# Patient Record
Sex: FEMALE | Race: WHITE
[De-identification: names, ages, dates, MRNs, and addresses within clinical notes are randomized per-mention and may not be internally consistent; named-entity substitution may affect disease eponyms.]

---

## 2017-07-19 ENCOUNTER — APPOINTMENT (OUTPATIENT)
Dept: MAMMOGRAPHY | Facility: CLINIC | Age: 57
End: 2017-07-19

## 2017-07-19 ENCOUNTER — APPOINTMENT (OUTPATIENT)
Dept: ULTRASOUND IMAGING | Facility: CLINIC | Age: 57
End: 2017-07-19

## 2017-07-19 ENCOUNTER — OUTPATIENT (OUTPATIENT)
Dept: OUTPATIENT SERVICES | Facility: HOSPITAL | Age: 57
LOS: 1 days | End: 2017-07-19
Payer: MEDICAID

## 2017-07-19 DIAGNOSIS — Z00.8 ENCOUNTER FOR OTHER GENERAL EXAMINATION: ICD-10-CM

## 2017-07-19 PROCEDURE — 77067 SCR MAMMO BI INCL CAD: CPT

## 2017-07-19 PROCEDURE — 77063 BREAST TOMOSYNTHESIS BI: CPT

## 2017-07-19 PROCEDURE — 76536 US EXAM OF HEAD AND NECK: CPT

## 2017-09-08 ENCOUNTER — APPOINTMENT (OUTPATIENT)
Dept: ULTRASOUND IMAGING | Facility: CLINIC | Age: 57
End: 2017-09-08
Payer: MEDICAID

## 2017-09-08 ENCOUNTER — OUTPATIENT (OUTPATIENT)
Dept: OUTPATIENT SERVICES | Facility: HOSPITAL | Age: 57
LOS: 1 days | End: 2017-09-08
Payer: MEDICAID

## 2017-09-08 DIAGNOSIS — Z00.8 ENCOUNTER FOR OTHER GENERAL EXAMINATION: ICD-10-CM

## 2017-09-08 PROCEDURE — 70490 CT SOFT TISSUE NECK W/O DYE: CPT

## 2017-09-08 PROCEDURE — 70490 CT SOFT TISSUE NECK W/O DYE: CPT | Mod: 26

## 2018-07-20 ENCOUNTER — APPOINTMENT (OUTPATIENT)
Dept: MAMMOGRAPHY | Facility: CLINIC | Age: 58
End: 2018-07-20
Payer: MEDICAID

## 2018-07-20 ENCOUNTER — OUTPATIENT (OUTPATIENT)
Dept: OUTPATIENT SERVICES | Facility: HOSPITAL | Age: 58
LOS: 1 days | End: 2018-07-20
Payer: MEDICAID

## 2018-07-20 DIAGNOSIS — Z00.8 ENCOUNTER FOR OTHER GENERAL EXAMINATION: ICD-10-CM

## 2018-07-20 PROCEDURE — 77063 BREAST TOMOSYNTHESIS BI: CPT | Mod: 26

## 2018-07-20 PROCEDURE — 77063 BREAST TOMOSYNTHESIS BI: CPT

## 2018-07-20 PROCEDURE — 77067 SCR MAMMO BI INCL CAD: CPT

## 2018-07-20 PROCEDURE — 77067 SCR MAMMO BI INCL CAD: CPT | Mod: 26

## 2018-11-07 ENCOUNTER — APPOINTMENT (OUTPATIENT)
Dept: RADIOLOGY | Facility: CLINIC | Age: 58
End: 2018-11-07
Payer: MEDICAID

## 2018-11-07 ENCOUNTER — OUTPATIENT (OUTPATIENT)
Dept: OUTPATIENT SERVICES | Facility: HOSPITAL | Age: 58
LOS: 1 days | End: 2018-11-07
Payer: MEDICAID

## 2018-11-07 DIAGNOSIS — Z00.8 ENCOUNTER FOR OTHER GENERAL EXAMINATION: ICD-10-CM

## 2018-11-07 PROCEDURE — 71046 X-RAY EXAM CHEST 2 VIEWS: CPT | Mod: 26

## 2018-11-07 PROCEDURE — 71046 X-RAY EXAM CHEST 2 VIEWS: CPT

## 2018-11-20 ENCOUNTER — RESULT REVIEW (OUTPATIENT)
Age: 58
End: 2018-11-20

## 2019-07-13 ENCOUNTER — EMERGENCY (EMERGENCY)
Facility: HOSPITAL | Age: 59
LOS: 0 days | Discharge: HOME | End: 2019-07-13
Attending: EMERGENCY MEDICINE | Admitting: EMERGENCY MEDICINE
Payer: MEDICAID

## 2019-07-13 VITALS
DIASTOLIC BLOOD PRESSURE: 85 MMHG | TEMPERATURE: 98 F | RESPIRATION RATE: 18 BRPM | HEART RATE: 77 BPM | SYSTOLIC BLOOD PRESSURE: 128 MMHG | OXYGEN SATURATION: 95 %

## 2019-07-13 VITALS — WEIGHT: 166.89 LBS | HEIGHT: 56 IN

## 2019-07-13 DIAGNOSIS — Z88.1 ALLERGY STATUS TO OTHER ANTIBIOTIC AGENTS STATUS: ICD-10-CM

## 2019-07-13 DIAGNOSIS — K64.4 RESIDUAL HEMORRHOIDAL SKIN TAGS: ICD-10-CM

## 2019-07-13 DIAGNOSIS — E06.3 AUTOIMMUNE THYROIDITIS: ICD-10-CM

## 2019-07-13 PROCEDURE — 99282 EMERGENCY DEPT VISIT SF MDM: CPT

## 2019-07-13 NOTE — ED STATDOCS - PROGRESS NOTE DETAILS
Patient seen and evalauted with ED attending at intake.  Rectal exam with nonthrombosed hemorrhoids.  No other findings.  She brought mucous with her, this was nonconcerning and likely a result of straining.  She had a normal colonoscopy 1 year ago.  Reviewed return precautions and GI follow up -Juan Carlos Buckner PA-C Anjel VIDAL for Dr. Butts: MDM: likely mucous from rectal sluffing from rectal mucosal lining. pt w/o abdominal pain, n/v/d. had nomial colonoscopy within last 5 years. will dc'd with GI f/u. Anjel VIDAL for Dr. Butts: MDM: likely mucous from rectal sloughing from rectal mucosal lining. pt w/o abdominal pain, n/v/d. had nomial colonoscopy within last 5 years. will dc'd with GI f/u.

## 2019-07-13 NOTE — ED STATDOCS - PHYSICAL EXAMINATION
*GEN: NAD; well appearing; A+O x3   *HEAD: NC/AT   *EYES/NOSE: PERRL & EOMI b/l  *THROAT: airway patent, moist mucous membranes  *NECK: Neck supple, no masses  *PULMONARY: CTA b/l, symmetric breath sounds.   *CARDIAC: s1s2, regular rhythm, no Murmur  *ABDOMEN:  ND, NT, soft, no guarding, no rebound, no masses. Rectal Exam chaperone by ALDAIR Rangel, external hemorrhoid non tender, brown color stool    *BACK: no CVA tenderness, Normal  spine   *EXTREMITIES: symmetric pulses, 2+ dp & radial pulses, capillary refill < 2 seconds, no cyanosis, no edema   *SKIN: no rash or bruising   *NEUROLOGIC: alert, CN 2-12 intact, moves all 4 extremities, full active & passive ROM in all extremities, normal baseline gait  *PSYCH: insight and judgment nl, memory nl, affect nl, thought nl *GEN: NAD; well appearing; A+O x3   *HEAD: NC/AT   *EYES/NOSE: PERRL & EOMI b/l  *THROAT: airway patent, moist mucous membranes  *NECK: Neck supple, no masses  *PULMONARY: CTA b/l, symmetric breath sounds.   *CARDIAC: s1s2, regular rhythm, no Murmur  *ABDOMEN:  ND, NT, soft, no guarding, no rebound, no masses. Rectal Exam chaperone by ALDAIR Rangel, external hemorrhoid non tender & non thrombosed, brown color stool    *BACK: no CVA tenderness, Normal  spine   *EXTREMITIES: symmetric pulses, 2+ dp & radial pulses, capillary refill < 2 seconds, no cyanosis, no edema   *SKIN: no rash or bruising   *NEUROLOGIC: alert, CN 2-12 intact, moves all 4 extremities, full active & passive ROM in all extremities, normal baseline gait  *PSYCH: insight and judgment nl, memory nl, affect nl, thought nl

## 2019-07-13 NOTE — ED STATDOCS - NS ED ROS FT
Review of Systems:  	•	CONSTITUTIONAL: no fever  	•	SKIN: no rash  	•	RESPIRATORY: no shortness of breath  	•	CARDIAC: no chest pain, no palpitations  	•	GI:  no abd pain, no nausea, no vomiting, no diarrhea, +mucous discharge from rectum  	•	GENITO-URINARY:  no dysuria; no hematuria    	•	MUSCULOSKELETAL:  no back pain  	•	NEUROLOGIC: no weakness  	•	ALLERGY: no rhinitis  	•	PSYSCHIATRIC: no anxiety Review of Systems:  	•	CONSTITUTIONAL: no fever  	•	SKIN: no rash  	•	RESPIRATORY: no shortness of breath  	•	CARDIAC: no chest pain,   	•	GI:  no abd pain, no nausea, no vomiting, no diarrhea, +mucous discharge from rectum  	•	GENITO-URINARY:  no dysuria; no hematuria    	•	MUSCULOSKELETAL:  no back pain  	•	PSYSCHIATRIC: + anxiety

## 2019-07-13 NOTE — ED ADULT TRIAGE NOTE - CHIEF COMPLAINT QUOTE
as per pt " I tried to have a bowel movement this morning but I had a discharge come out , I have a sample with me. I have a thyroid problem as well but I don't take medicine "

## 2019-07-13 NOTE — ED STATDOCS - CLINICAL SUMMARY MEDICAL DECISION MAKING FREE TEXT BOX
Patient with history of hemorrhoids presents reporting concern about abnormal stool.  She brought the sample, the stool was mucous, likely from straining.  She will follow up with GI.

## 2019-07-13 NOTE — ED STATDOCS - ATTENDING CONTRIBUTION TO CARE
I, Long Butts MD,  performed the initial face to face bedside interview with this patient regarding history of present illness, review of symptoms and relevant past medical, social and family history.  I completed an independent physical examination.  I was the initial provider who evaluated this patient. I have signed out the follow up of any pending tests (i.e. labs, radiological studies) to the ACP.  I have communicated the patient’s plan of care and disposition with the ACP.

## 2019-07-13 NOTE — ED STATDOCS - OBJECTIVE STATEMENT
Pertinent HPI/PMH/PSH/FHx/SHx and Review of Systems contained within  HPI: 59 yo female p/w CC stool complaints since yesterday. States when she had a BM, had "a lot of gas" and had a mucous discharge come out from rectum. Had sx before. Had colonoscopy a year ago with results negative.   PMH/PSH: Hashimoto's, Hemorrhoidectomy   ROS positive for: mucous discharge from rectum  ROS negative for: SOB, Nausea, vomiting, diarrhea, abdominal pain.  FamilyHx and SocialHx not otherwise contributory Pertinent HPI/PMH/PSH/FHx/SHx and Review of Systems contained within  HPI: 59 yo female p/w CC discharge from rectum with bowel movement. . States when she had a BM, had "a lot of gas" and had a mucous discharge come out from rectum. Had hemorrhoidectomy before. Had colonoscopy within 5 year ago which was wnl   PMH/PSH: Hashimoto's, Hemorrhoidectomy   ROS positive for: mucous discharge from rectum  ROS negative for: SOB, Nausea, vomiting, diarrhea, abdominal pain, dysuria, painful defecation, rectal bleeding  FamilyHx and SocialHx not otherwise contributory

## 2019-10-08 PROBLEM — E06.3 AUTOIMMUNE THYROIDITIS: Chronic | Status: ACTIVE | Noted: 2019-07-16

## 2019-10-17 ENCOUNTER — APPOINTMENT (OUTPATIENT)
Dept: ULTRASOUND IMAGING | Facility: CLINIC | Age: 59
End: 2019-10-17
Payer: MEDICAID

## 2019-10-17 ENCOUNTER — OUTPATIENT (OUTPATIENT)
Dept: OUTPATIENT SERVICES | Facility: HOSPITAL | Age: 59
LOS: 1 days | End: 2019-10-17
Payer: MEDICAID

## 2019-10-17 ENCOUNTER — APPOINTMENT (OUTPATIENT)
Dept: MAMMOGRAPHY | Facility: CLINIC | Age: 59
End: 2019-10-17
Payer: MEDICAID

## 2019-10-17 DIAGNOSIS — Z00.8 ENCOUNTER FOR OTHER GENERAL EXAMINATION: ICD-10-CM

## 2019-10-17 PROCEDURE — 77067 SCR MAMMO BI INCL CAD: CPT

## 2019-10-17 PROCEDURE — 77063 BREAST TOMOSYNTHESIS BI: CPT | Mod: 26

## 2019-10-17 PROCEDURE — 76536 US EXAM OF HEAD AND NECK: CPT | Mod: 26

## 2019-10-17 PROCEDURE — 77063 BREAST TOMOSYNTHESIS BI: CPT

## 2019-10-17 PROCEDURE — 76700 US EXAM ABDOM COMPLETE: CPT | Mod: 26

## 2019-10-17 PROCEDURE — 77067 SCR MAMMO BI INCL CAD: CPT | Mod: 26

## 2019-10-17 PROCEDURE — 76536 US EXAM OF HEAD AND NECK: CPT

## 2019-10-17 PROCEDURE — 76700 US EXAM ABDOM COMPLETE: CPT

## 2019-10-22 ENCOUNTER — OUTPATIENT (OUTPATIENT)
Dept: OUTPATIENT SERVICES | Facility: HOSPITAL | Age: 59
LOS: 1 days | End: 2019-10-22
Payer: MEDICAID

## 2019-10-22 ENCOUNTER — APPOINTMENT (OUTPATIENT)
Dept: RADIOLOGY | Facility: CLINIC | Age: 59
End: 2019-10-22
Payer: MEDICAID

## 2019-10-22 DIAGNOSIS — Z00.8 ENCOUNTER FOR OTHER GENERAL EXAMINATION: ICD-10-CM

## 2019-10-22 PROCEDURE — 73140 X-RAY EXAM OF FINGER(S): CPT | Mod: 26,RT

## 2019-10-22 PROCEDURE — 73140 X-RAY EXAM OF FINGER(S): CPT

## 2019-11-18 ENCOUNTER — APPOINTMENT (OUTPATIENT)
Dept: ORTHOPEDIC SURGERY | Facility: CLINIC | Age: 59
End: 2019-11-18
Payer: MEDICAID

## 2019-11-18 VITALS
HEIGHT: 56.5 IN | BODY MASS INDEX: 29.97 KG/M2 | TEMPERATURE: 97.7 F | WEIGHT: 137 LBS | HEART RATE: 80 BPM | DIASTOLIC BLOOD PRESSURE: 80 MMHG | SYSTOLIC BLOOD PRESSURE: 118 MMHG

## 2019-11-18 DIAGNOSIS — Z78.9 OTHER SPECIFIED HEALTH STATUS: ICD-10-CM

## 2019-11-18 DIAGNOSIS — M20.011 MALLET FINGER OF RIGHT FINGER(S): ICD-10-CM

## 2019-11-18 PROCEDURE — 99203 OFFICE O/P NEW LOW 30 MIN: CPT

## 2019-11-21 NOTE — PHYSICAL EXAM
[Normal] : Gait: normal [de-identified] : Left Fifth Finger:\par \par                                    					           Claimant:  	   Normal:  \par \par Thumb Interphalangeal Hyperextension/Flexion		                           15H/80             15H/80\par \par Thumb Metacarpophalangeal Hyperextension/Flexion	                           10/55	    10/55\par \par Finger DIP Joints Extension/Flexion				             0/80	     0/80\par \par Finger PIP Joints Extension/Flexion 				             0/100	     0/100\par \par Finger MCP Joints Hyperextension/Flexion 			      (0-45H)/90	     (0-45H)/90\par \par FDS, FDP intact.  No triggering.  No tenderness or swelling over the A1 pulley for each finger.  No instability to varus or valgus stress.  No motor or sensory deficits.   Less than two second capillary refill.  Skin is intact.  No rashes, scars or lesions.\par \par Right Fifth Finger:\par Mallet finger.  Inability to actively extend the DIP joint.  \par \par  [de-identified] : Appearance:  Well-developed, well-nourished female in no acute distress.\par \par   [de-identified] : Review of radiographs, show diffuse degenerative changes.

## 2019-11-21 NOTE — DISCUSSION/SUMMARY
[de-identified] : At this time, due to mallet finger of the right fifth finger, I instructed her on home therapeutic modalities.  She will be reassessed in four to six weeks.

## 2019-11-21 NOTE — HISTORY OF PRESENT ILLNESS
[de-identified] : The patient comes in today stating that a year ago she injured her right fifth finger.  She notes some persistent swelling and her right fifth finger bent at the tip.  She never had any treatment for it.  The patient states the pain is intermittent.

## 2019-12-02 ENCOUNTER — FORM ENCOUNTER (OUTPATIENT)
Age: 59
End: 2019-12-02

## 2019-12-03 ENCOUNTER — OUTPATIENT (OUTPATIENT)
Dept: OUTPATIENT SERVICES | Facility: HOSPITAL | Age: 59
LOS: 1 days | End: 2019-12-03
Payer: MEDICAID

## 2019-12-03 ENCOUNTER — APPOINTMENT (OUTPATIENT)
Dept: NEUROLOGY | Facility: CLINIC | Age: 59
End: 2019-12-03
Payer: MEDICAID

## 2019-12-03 ENCOUNTER — APPOINTMENT (OUTPATIENT)
Dept: ULTRASOUND IMAGING | Facility: CLINIC | Age: 59
End: 2019-12-03
Payer: MEDICAID

## 2019-12-03 VITALS
BODY MASS INDEX: 29.97 KG/M2 | WEIGHT: 137 LBS | HEART RATE: 76 BPM | HEIGHT: 56.5 IN | SYSTOLIC BLOOD PRESSURE: 136 MMHG | DIASTOLIC BLOOD PRESSURE: 94 MMHG

## 2019-12-03 DIAGNOSIS — Z00.8 ENCOUNTER FOR OTHER GENERAL EXAMINATION: ICD-10-CM

## 2019-12-03 DIAGNOSIS — Z87.891 PERSONAL HISTORY OF NICOTINE DEPENDENCE: ICD-10-CM

## 2019-12-03 DIAGNOSIS — H93.19 TINNITUS, UNSPECIFIED EAR: ICD-10-CM

## 2019-12-03 PROCEDURE — 99204 OFFICE O/P NEW MOD 45 MIN: CPT

## 2019-12-03 PROCEDURE — 93880 EXTRACRANIAL BILAT STUDY: CPT

## 2019-12-03 PROCEDURE — 93880 EXTRACRANIAL BILAT STUDY: CPT | Mod: 26

## 2019-12-03 NOTE — REVIEW OF SYSTEMS
[As Noted in HPI] : as noted in HPI [Shortness Of Breath] : shortness of breath [Constipation] : constipation [Diarrhea] : diarrhea [Arthralgias] : arthralgias [Joint Pain] : joint pain [Negative] : Genitourinary [FreeTextEntry4] : tinnitus [de-identified] : incoordination, weakness, imbalance, difficulty speaking [FreeTextEntry7] : difficulty swalowing [FreeTextEntry9] : neck pain

## 2019-12-03 NOTE — HISTORY OF PRESENT ILLNESS
[FreeTextEntry1] : Ms. Burns presents today for evaluation of  balance problems. \par \par She reports that she has had difficulty with her balance for over five years. \par She says that she has difficulty walking a straight line. \par She feels very off balance when her eyes are closed. \par She typically hold on to something when walking to prevent her from falling. \par She denies feeling dizzy.\par She reports that her vision was checked and her eye sight is perfect with her balance. \par She reports hearing loss and tinnitus. She feels as if her head is full of fluid.\par She was told in the past by Dr. Mcqueen that she had fluid in her ears and she was advised to try allergy medications. \par She reports difficulty swallowing which she attributes to thyroid nodules. \par She reports intermittent loss of feeling and pins and needles in her hands and feet. She occasionally drops things. \par She had two bad falls.\par One occurred while wearing flip flops and the shoe got stuck. \par The other fall occurred when she tripped over a crack in the sidewalk. \par \par She denies having any tremors and denies having any dream enactment behavior. \par She reports that she has hypothyroidism but has had reactions to various medications so she is not being treated at this time. \par \par She also reports that she is "full of water" but can not take diuretics because her blood pressure runs low.\par

## 2019-12-03 NOTE — PHYSICAL EXAM
[FreeTextEntry1] : Examination:\par Constitutional: normal, no apparent distress\par Eyes: normal conjunctiva b/l, no ptosis, visual fields full\par Respiratory: no respiratory distress, normal effort, normal auscultation\par Cardiovascular: normal rate, rhythm, no murmurs\par Neck: supple, no masses\par Vascular: carotids normal\par Skin: normal color, no rashes\par Psych: normal mood, affect\par \par Neurological:\par Memory: normal memory, oriented to person, place, time\par Language intact/no aphasia\par Cranial Nerves: II-XII intact, Pupils equally round and reactive to light, ocular muscles/movements intact, no ptosis, no facial weakness, tongue protrudes normally in the midline, \par Motor: normal tone, no pronator drift, full strength in all four extremities in the proximal and distal muscle groups\par Coordination: Fine motor movements intact, rapid alternating movements intact, finger to nose intact bilaterally, no cogwheel rigidity, no tremors. \par Sensory: intact to light touch, vibration, joint position sense, slightly positive Romberg\par DTRs: symmetric, 2+ in b/l triceps, 2+ in b/l biceps, 2+ in b/l brachioradialis, 2+ in bilateral patellars, 2+ in bilateral Achilles, Babinskis negative bilaterally\par Gait: slightly wide based, slow, hesitant, slightly unsteady\par \par

## 2019-12-03 NOTE — DISCUSSION/SUMMARY
[FreeTextEntry1] : Ms. Burns is a 59 year old woman with a history of difficulty with balance. She also reports chronic tinnitus and hearing loss.\par \par On examination she does have a wide based, slightly unsteady gait.\par Otherwise her exam is essentially non-focal without signs of parkinsonism or obvious signs of neuropathy.\par She has significant hypothyroidism which is untreated due to intolerance of medication and it is quite possible that this is a contributing factor.\par \par Due to the constellation of tinnitus, hearing loss and unsteady gait, an MRI brain and IACs will be performed to rule out any acoustic schwannoma or other pathology.\par If MRI brain is unremarkable, nerve conduction studies/EMG can be considered to look for any neuropathy and if present, additional blood work will be performed.\par Balance therapy can also be considered.\par She would like to see a chiropractor. I advised her to avoid chiropractic manipulations of the neck as there is a risk of arterial dissection and stroke.\par \par Carotid ultrasound will also be ordered. She has a family history of significant carotid stenosis. \par \par I will follow up with her after her MRI, sooner if needed.

## 2019-12-03 NOTE — CONSULT LETTER
[Dear  ___] : Dear  [unfilled], [Consult Letter:] : I had the pleasure of evaluating your patient, [unfilled]. [Consult Closing:] : Thank you very much for allowing me to participate in the care of this patient.  If you have any questions, please do not hesitate to contact me. [Please see my note below.] : Please see my note below. [FreeTextEntry3] : Sincerely,\par \par \par Nimco Diego MD\par Diplomate, American Academy of Psychiatry and Neurology\par Board Certified in the Subspecialty of Clinical Neurophysiology\par Board Certified in the Subspecialty of Sleep Medicine\par Board Certified in the Subspecialty of Epilepsy\par  [FreeTextEntry2] : Lenore Sams

## 2019-12-08 ENCOUNTER — FORM ENCOUNTER (OUTPATIENT)
Age: 59
End: 2019-12-08

## 2019-12-09 ENCOUNTER — OUTPATIENT (OUTPATIENT)
Dept: OUTPATIENT SERVICES | Facility: HOSPITAL | Age: 59
LOS: 1 days | End: 2019-12-09
Payer: MEDICAID

## 2019-12-09 ENCOUNTER — APPOINTMENT (OUTPATIENT)
Dept: MRI IMAGING | Facility: CLINIC | Age: 59
End: 2019-12-09
Payer: MEDICAID

## 2019-12-09 DIAGNOSIS — R26.89 OTHER ABNORMALITIES OF GAIT AND MOBILITY: ICD-10-CM

## 2019-12-09 PROCEDURE — 70553 MRI BRAIN STEM W/O & W/DYE: CPT | Mod: 26

## 2019-12-09 PROCEDURE — A9585: CPT

## 2019-12-09 PROCEDURE — 70553 MRI BRAIN STEM W/O & W/DYE: CPT

## 2019-12-17 ENCOUNTER — APPOINTMENT (OUTPATIENT)
Dept: NEUROLOGY | Facility: CLINIC | Age: 59
End: 2019-12-17
Payer: MEDICAID

## 2019-12-17 VITALS
BODY MASS INDEX: 29.31 KG/M2 | WEIGHT: 134 LBS | HEIGHT: 56.5 IN | DIASTOLIC BLOOD PRESSURE: 93 MMHG | HEART RATE: 69 BPM | SYSTOLIC BLOOD PRESSURE: 152 MMHG

## 2019-12-17 DIAGNOSIS — M41.9 SCOLIOSIS, UNSPECIFIED: ICD-10-CM

## 2019-12-17 DIAGNOSIS — H70.93 UNSPECIFIED MASTOIDITIS, BILATERAL: ICD-10-CM

## 2019-12-17 DIAGNOSIS — R26.89 OTHER ABNORMALITIES OF GAIT AND MOBILITY: ICD-10-CM

## 2019-12-17 PROCEDURE — 99213 OFFICE O/P EST LOW 20 MIN: CPT

## 2019-12-17 NOTE — CONSULT LETTER
[Dear  ___] : Dear  [unfilled], [Please see my note below.] : Please see my note below. [Consult Letter:] : I had the pleasure of evaluating your patient, [unfilled]. [Consult Closing:] : Thank you very much for allowing me to participate in the care of this patient.  If you have any questions, please do not hesitate to contact me. [FreeTextEntry2] : Lenore Sams [FreeTextEntry3] : Sincerely,\par \par \par Nimco Diego MD\par Diplomate, American Academy of Psychiatry and Neurology\par Board Certified in the Subspecialty of Clinical Neurophysiology\par Board Certified in the Subspecialty of Sleep Medicine\par Board Certified in the Subspecialty of Epilepsy\par

## 2019-12-17 NOTE — HISTORY OF PRESENT ILLNESS
[FreeTextEntry1] : I last saw Ms. Burns on 12/3/19.\par She says that her symptoms are unchanged since the last visit.\par She reports that she fell on Saturday. She slipped while wearing boots. She does not think it was related to her balance issue.\par She would like to see a chiropractor but she is not sure if her plan pays for chiropractic treatment.\par She would like x-rays done since she fell and she believes that her spine is crooked.\par

## 2019-12-17 NOTE — DATA REVIEWED
[de-identified] : MRI brain with and without contrast 12/9/19: \par 1. Internal auditory canals: No evidence of vestibular schwannoma.Marked fluid is seen within \par the BILATERAL mastoid air cells. \par  2. Brain: Mild periventricular white matter ischemia is noted.. [de-identified] : ultrasound thyroid/parathyroid 10/17/19: atrophic gland\par \par blood tests 10/16/19: \par \par carotid ultrasound 12/3/19: \par Impression: Intimal thickening without duplex evidence of hemodynamically significant stenosis.

## 2019-12-18 ENCOUNTER — FORM ENCOUNTER (OUTPATIENT)
Age: 59
End: 2019-12-18

## 2019-12-19 ENCOUNTER — APPOINTMENT (OUTPATIENT)
Dept: RADIOLOGY | Facility: CLINIC | Age: 59
End: 2019-12-19
Payer: MEDICAID

## 2019-12-19 ENCOUNTER — OUTPATIENT (OUTPATIENT)
Dept: OUTPATIENT SERVICES | Facility: HOSPITAL | Age: 59
LOS: 1 days | End: 2019-12-19
Payer: MEDICAID

## 2019-12-19 DIAGNOSIS — Z00.8 ENCOUNTER FOR OTHER GENERAL EXAMINATION: ICD-10-CM

## 2019-12-19 DIAGNOSIS — M41.9 SCOLIOSIS, UNSPECIFIED: ICD-10-CM

## 2019-12-19 PROCEDURE — 72082 X-RAY EXAM ENTIRE SPI 2/3 VW: CPT

## 2019-12-19 PROCEDURE — 72082 X-RAY EXAM ENTIRE SPI 2/3 VW: CPT | Mod: 26

## 2020-08-14 ENCOUNTER — APPOINTMENT (OUTPATIENT)
Dept: RADIOLOGY | Facility: CLINIC | Age: 60
End: 2020-08-14
Payer: MEDICAID

## 2020-08-14 ENCOUNTER — OUTPATIENT (OUTPATIENT)
Dept: OUTPATIENT SERVICES | Facility: HOSPITAL | Age: 60
LOS: 1 days | End: 2020-08-14
Payer: MEDICAID

## 2020-08-14 DIAGNOSIS — Z00.8 ENCOUNTER FOR OTHER GENERAL EXAMINATION: ICD-10-CM

## 2020-08-14 PROCEDURE — 73562 X-RAY EXAM OF KNEE 3: CPT

## 2020-08-14 PROCEDURE — 73562 X-RAY EXAM OF KNEE 3: CPT | Mod: 26,LT

## 2020-09-16 ENCOUNTER — APPOINTMENT (OUTPATIENT)
Dept: ORTHOPEDIC SURGERY | Facility: CLINIC | Age: 60
End: 2020-09-16
Payer: MEDICAID

## 2020-09-16 VITALS
HEIGHT: 56 IN | BODY MASS INDEX: 30.82 KG/M2 | WEIGHT: 137 LBS | DIASTOLIC BLOOD PRESSURE: 67 MMHG | HEART RATE: 81 BPM | TEMPERATURE: 98.2 F | SYSTOLIC BLOOD PRESSURE: 105 MMHG

## 2020-09-16 DIAGNOSIS — S83.207A UNSPECIFIED TEAR OF UNSPECIFIED MENISCUS, CURRENT INJURY, LEFT KNEE, INITIAL ENCOUNTER: ICD-10-CM

## 2020-09-16 DIAGNOSIS — M17.12 UNILATERAL PRIMARY OSTEOARTHRITIS, LEFT KNEE: ICD-10-CM

## 2020-09-16 PROCEDURE — 99213 OFFICE O/P EST LOW 20 MIN: CPT

## 2020-09-21 NOTE — HISTORY OF PRESENT ILLNESS
[de-identified] : The patient comes in today with complaints of pain to her left knee going on for the last three months.  It is getting a little worse recently.

## 2020-09-21 NOTE — PHYSICAL EXAM
[de-identified] : Right Knee: \par Range of Motion in Degrees	\par 	                  Claimant:	Normal:	\par Flexion Active	  135 	                135-degrees	\par Flexion Passive	  135	                135-degrees	\par Extension Active	  0-5	                0-5-degrees	\par Extension Passive	  0-5	                0-5-degrees	\par \par No weakness to flexion/extension.  No evidence of instability in the AP plane or varus or valgus stress.  Negative  Lachman.  Negative pivot shift.  Negative anterior drawer test.  Negative posterior drawer test.  Negative Wally.  Negative Apley grind.  No medial or lateral joint line tenderness.  No tenderness over the medial and lateral facet of the patella.  No patellofemoral crepitations.  No lateral tilting patella.  No patellar apprehension.  No crepitation in the medial and lateral femoral condyle.  No proximal or distal swelling, edema or tenderness.  No gross motor or sensory deficits.  No intra-articular swelling.  2+ DP and PT pulses. No varus or valgus malalignment.  Skin is intact.  No rashes, scars or lesions.  \par \par Left Knee: \par Range of Motion in Degrees	\par 	                  Claimant:	Normal:	\par Flexion Active	  135 	               135-degrees	\par Flexion Passive	  135	               135-degrees	\par Extension Active	  0-5	               0-5-degrees	\par Extension Passive     0-5	               0-5-degrees	\par \par No weakness to flexion/extension.  No evidence of instability in the AP plane or varus or valgus stress.  Negative  Lachman.  Negative pivot shift.  Negative anterior drawer test.  Negative posterior drawer test.  Positive Wally.  Positive Apley grind.  Positive medial joint line tenderness.  Negative lateral joint line tenderness.  Positive tenderness over the medial and lateral facet of the patella.  Positive patellofemoral crepitations.  No lateral tilting patella.  No patellar apprehension.  Positive crepitation in the medial and lateral femoral condyle.  No proximal or distal swelling, edema or tenderness.  No gross motor or sensory deficits.  Mild effusion.  2+ DP and PT pulses.  No varus or valgus malalignment.  Skin is intact.  No rashes, scars or lesions.  \par  [de-identified] : Ambulating with a slightly antalgic to antalgic gait.  Station:  Normal.  [de-identified] : Appearance:  Well-developed, well-nourished female in no acute distress.\par   [de-identified] : Review of radiographs show mild degenerative changes.

## 2020-09-21 NOTE — DISCUSSION/SUMMARY
[de-identified] : At this time, due to osteoarthritis with possible meniscal tear of the left knee, I recommended physical therapy and anti-inflammatory.  She will be reassessed in three to four weeks.

## 2020-09-21 NOTE — ADDENDUM
[FreeTextEntry1] : This note was written by Smiley Montalvo on 09/21/2020 acting as a scribe for CHIP FERNÁNDEZ III, MD

## 2020-10-08 ENCOUNTER — APPOINTMENT (OUTPATIENT)
Dept: ORTHOPEDIC SURGERY | Facility: CLINIC | Age: 60
End: 2020-10-08

## 2021-03-12 NOTE — ADDENDUM
[FreeTextEntry1] : This note was written by Smiley Montalvo on 11/21/2019 acting as a scribe for CHIP FERNÁNDEZ III, MD 12-Mar-2021

## 2021-03-26 ENCOUNTER — APPOINTMENT (OUTPATIENT)
Dept: RADIOLOGY | Facility: CLINIC | Age: 61
End: 2021-03-26
Payer: MEDICAID

## 2021-03-26 ENCOUNTER — RESULT REVIEW (OUTPATIENT)
Age: 61
End: 2021-03-26

## 2021-03-26 ENCOUNTER — OUTPATIENT (OUTPATIENT)
Dept: OUTPATIENT SERVICES | Facility: HOSPITAL | Age: 61
LOS: 1 days | End: 2021-03-26
Payer: MEDICAID

## 2021-03-26 DIAGNOSIS — E03.9 HYPOTHYROIDISM, UNSPECIFIED: ICD-10-CM

## 2021-03-26 DIAGNOSIS — R13.10 DYSPHAGIA, UNSPECIFIED: ICD-10-CM

## 2021-03-26 PROCEDURE — 73620 X-RAY EXAM OF FOOT: CPT

## 2021-03-26 PROCEDURE — 73620 X-RAY EXAM OF FOOT: CPT | Mod: 26,RT

## 2021-04-09 ENCOUNTER — RESULT REVIEW (OUTPATIENT)
Age: 61
End: 2021-04-09

## 2021-04-09 ENCOUNTER — APPOINTMENT (OUTPATIENT)
Dept: MAMMOGRAPHY | Facility: CLINIC | Age: 61
End: 2021-04-09
Payer: MEDICAID

## 2021-04-09 ENCOUNTER — OUTPATIENT (OUTPATIENT)
Dept: OUTPATIENT SERVICES | Facility: HOSPITAL | Age: 61
LOS: 1 days | End: 2021-04-09
Payer: MEDICAID

## 2021-04-09 DIAGNOSIS — Z12.31 ENCOUNTER FOR SCREENING MAMMOGRAM FOR MALIGNANT NEOPLASM OF BREAST: ICD-10-CM

## 2021-04-09 PROCEDURE — 77063 BREAST TOMOSYNTHESIS BI: CPT

## 2021-04-09 PROCEDURE — 77063 BREAST TOMOSYNTHESIS BI: CPT | Mod: 26

## 2021-04-09 PROCEDURE — 77067 SCR MAMMO BI INCL CAD: CPT

## 2021-04-09 PROCEDURE — 77067 SCR MAMMO BI INCL CAD: CPT | Mod: 26

## 2021-09-09 ENCOUNTER — EMERGENCY (EMERGENCY)
Facility: HOSPITAL | Age: 61
LOS: 0 days | Discharge: HOME | End: 2021-09-09
Attending: EMERGENCY MEDICINE
Payer: MEDICAID

## 2021-09-09 VITALS
SYSTOLIC BLOOD PRESSURE: 120 MMHG | RESPIRATION RATE: 20 BRPM | OXYGEN SATURATION: 98 % | HEART RATE: 102 BPM | TEMPERATURE: 99 F | DIASTOLIC BLOOD PRESSURE: 84 MMHG

## 2021-09-09 VITALS
SYSTOLIC BLOOD PRESSURE: 134 MMHG | RESPIRATION RATE: 18 BRPM | DIASTOLIC BLOOD PRESSURE: 91 MMHG | HEART RATE: 86 BPM | TEMPERATURE: 98 F | HEIGHT: 56 IN | WEIGHT: 147.05 LBS | OXYGEN SATURATION: 97 %

## 2021-09-09 DIAGNOSIS — E03.9 HYPOTHYROIDISM, UNSPECIFIED: ICD-10-CM

## 2021-09-09 DIAGNOSIS — R22.0 LOCALIZED SWELLING, MASS AND LUMP, HEAD: ICD-10-CM

## 2021-09-09 DIAGNOSIS — J38.4 EDEMA OF LARYNX: ICD-10-CM

## 2021-09-09 DIAGNOSIS — R06.00 DYSPNEA, UNSPECIFIED: ICD-10-CM

## 2021-09-09 DIAGNOSIS — L03.211 CELLULITIS OF FACE: ICD-10-CM

## 2021-09-09 DIAGNOSIS — Z88.1 ALLERGY STATUS TO OTHER ANTIBIOTIC AGENTS STATUS: ICD-10-CM

## 2021-09-09 DIAGNOSIS — L53.9 ERYTHEMATOUS CONDITION, UNSPECIFIED: ICD-10-CM

## 2021-09-09 DIAGNOSIS — Z88.8 ALLERGY STATUS TO OTHER DRUGS, MEDICAMENTS AND BIOLOGICAL SUBSTANCES STATUS: ICD-10-CM

## 2021-09-09 DIAGNOSIS — Z88.0 ALLERGY STATUS TO PENICILLIN: ICD-10-CM

## 2021-09-09 DIAGNOSIS — R51.9 HEADACHE, UNSPECIFIED: ICD-10-CM

## 2021-09-09 DIAGNOSIS — E06.3 AUTOIMMUNE THYROIDITIS: ICD-10-CM

## 2021-09-09 DIAGNOSIS — H60.92 UNSPECIFIED OTITIS EXTERNA, LEFT EAR: ICD-10-CM

## 2021-09-09 LAB
ADD ON TEST-SPECIMEN IN LAB: SIGNIFICANT CHANGE UP
ALBUMIN SERPL ELPH-MCNC: 5 G/DL — SIGNIFICANT CHANGE UP (ref 3.3–5)
ALP SERPL-CCNC: 40 U/L — SIGNIFICANT CHANGE UP (ref 40–120)
ALT FLD-CCNC: 23 U/L — SIGNIFICANT CHANGE UP (ref 12–78)
ANION GAP SERPL CALC-SCNC: 7 MMOL/L — SIGNIFICANT CHANGE UP (ref 5–17)
APPEARANCE UR: CLEAR — SIGNIFICANT CHANGE UP
APTT BLD: 32.7 SEC — SIGNIFICANT CHANGE UP (ref 27.5–35.5)
AST SERPL-CCNC: 45 U/L — HIGH (ref 15–37)
BASOPHILS # BLD AUTO: 0.08 K/UL — SIGNIFICANT CHANGE UP (ref 0–0.2)
BASOPHILS NFR BLD AUTO: 0.6 % — SIGNIFICANT CHANGE UP (ref 0–2)
BILIRUB SERPL-MCNC: 0.7 MG/DL — SIGNIFICANT CHANGE UP (ref 0.2–1.2)
BILIRUB UR-MCNC: NEGATIVE — SIGNIFICANT CHANGE UP
BUN SERPL-MCNC: 12 MG/DL — SIGNIFICANT CHANGE UP (ref 7–23)
CALCIUM SERPL-MCNC: 10.2 MG/DL — HIGH (ref 8.5–10.1)
CHLORIDE SERPL-SCNC: 103 MMOL/L — SIGNIFICANT CHANGE UP (ref 96–108)
CO2 SERPL-SCNC: 26 MMOL/L — SIGNIFICANT CHANGE UP (ref 22–31)
COLOR SPEC: YELLOW — SIGNIFICANT CHANGE UP
CREAT SERPL-MCNC: 1.4 MG/DL — HIGH (ref 0.5–1.3)
DIFF PNL FLD: NEGATIVE — SIGNIFICANT CHANGE UP
EOSINOPHIL # BLD AUTO: 0.28 K/UL — SIGNIFICANT CHANGE UP (ref 0–0.5)
EOSINOPHIL NFR BLD AUTO: 2 % — SIGNIFICANT CHANGE UP (ref 0–6)
GLUCOSE SERPL-MCNC: 105 MG/DL — HIGH (ref 70–99)
GLUCOSE UR QL: NEGATIVE MG/DL — SIGNIFICANT CHANGE UP
HCT VFR BLD CALC: 35.6 % — SIGNIFICANT CHANGE UP (ref 34.5–45)
HGB BLD-MCNC: 11.7 G/DL — SIGNIFICANT CHANGE UP (ref 11.5–15.5)
IMM GRANULOCYTES NFR BLD AUTO: 0.6 % — SIGNIFICANT CHANGE UP (ref 0–1.5)
INR BLD: 1.03 RATIO — SIGNIFICANT CHANGE UP (ref 0.88–1.16)
KETONES UR-MCNC: NEGATIVE — SIGNIFICANT CHANGE UP
LACTATE SERPL-SCNC: 1.3 MMOL/L — SIGNIFICANT CHANGE UP (ref 0.7–2)
LEUKOCYTE ESTERASE UR-ACNC: NEGATIVE — SIGNIFICANT CHANGE UP
LYMPHOCYTES # BLD AUTO: 1.33 K/UL — SIGNIFICANT CHANGE UP (ref 1–3.3)
LYMPHOCYTES # BLD AUTO: 9.5 % — LOW (ref 13–44)
MCHC RBC-ENTMCNC: 32.9 GM/DL — SIGNIFICANT CHANGE UP (ref 32–36)
MCHC RBC-ENTMCNC: 33 PG — SIGNIFICANT CHANGE UP (ref 27–34)
MCV RBC AUTO: 100.3 FL — HIGH (ref 80–100)
MONOCYTES # BLD AUTO: 1.52 K/UL — HIGH (ref 0–0.9)
MONOCYTES NFR BLD AUTO: 10.9 % — SIGNIFICANT CHANGE UP (ref 2–14)
NEUTROPHILS # BLD AUTO: 10.7 K/UL — HIGH (ref 1.8–7.4)
NEUTROPHILS NFR BLD AUTO: 76.4 % — SIGNIFICANT CHANGE UP (ref 43–77)
NITRITE UR-MCNC: NEGATIVE — SIGNIFICANT CHANGE UP
PH UR: 7 — SIGNIFICANT CHANGE UP (ref 5–8)
PLATELET # BLD AUTO: 195 K/UL — SIGNIFICANT CHANGE UP (ref 150–400)
POTASSIUM SERPL-MCNC: 4 MMOL/L — SIGNIFICANT CHANGE UP (ref 3.5–5.3)
POTASSIUM SERPL-SCNC: 4 MMOL/L — SIGNIFICANT CHANGE UP (ref 3.5–5.3)
PROT SERPL-MCNC: 9 GM/DL — HIGH (ref 6–8.3)
PROT UR-MCNC: 15 MG/DL
PROTHROM AB SERPL-ACNC: 11.9 SEC — SIGNIFICANT CHANGE UP (ref 10.6–13.6)
RBC # BLD: 3.55 M/UL — LOW (ref 3.8–5.2)
RBC # FLD: 15.3 % — HIGH (ref 10.3–14.5)
SARS-COV-2 RNA SPEC QL NAA+PROBE: SIGNIFICANT CHANGE UP
SODIUM SERPL-SCNC: 136 MMOL/L — SIGNIFICANT CHANGE UP (ref 135–145)
SP GR SPEC: 1.01 — SIGNIFICANT CHANGE UP (ref 1.01–1.02)
TSH SERPL-MCNC: 152 UU/ML — HIGH (ref 0.34–4.82)
UROBILINOGEN FLD QL: NEGATIVE MG/DL — SIGNIFICANT CHANGE UP
WBC # BLD: 14 K/UL — HIGH (ref 3.8–10.5)
WBC # FLD AUTO: 14 K/UL — HIGH (ref 3.8–10.5)

## 2021-09-09 PROCEDURE — 84484 ASSAY OF TROPONIN QUANT: CPT

## 2021-09-09 PROCEDURE — 70491 CT SOFT TISSUE NECK W/DYE: CPT | Mod: 26,MA

## 2021-09-09 PROCEDURE — 99284 EMERGENCY DEPT VISIT MOD MDM: CPT | Mod: 25

## 2021-09-09 PROCEDURE — 71045 X-RAY EXAM CHEST 1 VIEW: CPT

## 2021-09-09 PROCEDURE — U0003: CPT

## 2021-09-09 PROCEDURE — 85025 COMPLETE CBC W/AUTO DIFF WBC: CPT

## 2021-09-09 PROCEDURE — 84436 ASSAY OF TOTAL THYROXINE: CPT

## 2021-09-09 PROCEDURE — 93010 ELECTROCARDIOGRAM REPORT: CPT

## 2021-09-09 PROCEDURE — 99285 EMERGENCY DEPT VISIT HI MDM: CPT

## 2021-09-09 PROCEDURE — 82150 ASSAY OF AMYLASE: CPT

## 2021-09-09 PROCEDURE — 83605 ASSAY OF LACTIC ACID: CPT

## 2021-09-09 PROCEDURE — U0005: CPT

## 2021-09-09 PROCEDURE — 36415 COLL VENOUS BLD VENIPUNCTURE: CPT

## 2021-09-09 PROCEDURE — 81001 URINALYSIS AUTO W/SCOPE: CPT

## 2021-09-09 PROCEDURE — 84480 ASSAY TRIIODOTHYRONINE (T3): CPT

## 2021-09-09 PROCEDURE — 85379 FIBRIN DEGRADATION QUANT: CPT

## 2021-09-09 PROCEDURE — 96374 THER/PROPH/DIAG INJ IV PUSH: CPT

## 2021-09-09 PROCEDURE — 71045 X-RAY EXAM CHEST 1 VIEW: CPT | Mod: 26

## 2021-09-09 PROCEDURE — 87040 BLOOD CULTURE FOR BACTERIA: CPT

## 2021-09-09 PROCEDURE — 93005 ELECTROCARDIOGRAM TRACING: CPT

## 2021-09-09 PROCEDURE — 83880 ASSAY OF NATRIURETIC PEPTIDE: CPT

## 2021-09-09 PROCEDURE — 85730 THROMBOPLASTIN TIME PARTIAL: CPT

## 2021-09-09 PROCEDURE — 80053 COMPREHEN METABOLIC PANEL: CPT

## 2021-09-09 PROCEDURE — 84443 ASSAY THYROID STIM HORMONE: CPT

## 2021-09-09 PROCEDURE — 83690 ASSAY OF LIPASE: CPT

## 2021-09-09 PROCEDURE — 85610 PROTHROMBIN TIME: CPT

## 2021-09-09 PROCEDURE — 70491 CT SOFT TISSUE NECK W/DYE: CPT

## 2021-09-09 RX ORDER — NEOMYCIN/POLYMYXIN B/HYDROCORT
1 SUSPENSION, DROPS(FINAL DOSAGE FORM)(ML) OTIC (EAR) ONCE
Refills: 0 | Status: COMPLETED | OUTPATIENT
Start: 2021-09-09 | End: 2021-09-09

## 2021-09-09 RX ORDER — DEXAMETHASONE 0.5 MG/5ML
10 ELIXIR ORAL ONCE
Refills: 0 | Status: COMPLETED | OUTPATIENT
Start: 2021-09-09 | End: 2021-09-09

## 2021-09-09 RX ADMIN — Medication 100 MILLIGRAM(S): at 18:06

## 2021-09-09 NOTE — ED PROVIDER NOTE - PROGRESS NOTE DETAILS
Scrgeorge DM for Dr. Butts: Pt with vocal cord edema, adamantly  refusing transfer for ENT, states she sees Dr. Mcqueen. Pt also refusing steroids, understands risks and benefits but agrees to admission only at [facility]. Pt in no respiratory distress, satting 100% on 4L, does not need ICU.   I, Etelvina Rangel attest that this documentation has been prepared under the direction and in the presence of Doctor Butts. ct shows: Left facial soft tissue swelling with involvement of the left external auditory canal soft tissues. This could be secondary to otitis externa with reactive parotiditis. There is also apparent swelling of the aryepiglottic folds and vocal cords. Scribe DM for Dr. Butts: Pt with vocal cord edema, adamantly  refusing transfer for ENT, states she sees Dr. Mcqueen. Pt also refusing steroids, understands risks and benefits but agrees to admission only at [facility]. Pt in no respiratory distress, satting 100% on 4L, does not need ICU. (conversation witnessed by RN Margoth Rodas)  I, Etelvina Rangel attest that this documentation has been prepared under the direction and in the presence of Doctor Butts. lazaro: dr iyer said hospitalisit cannot accept admission. recommends icu eval & calling pts ent dr leonard. lazaro: still refusing steroids & transfer for ent (witnessed by RN Mariam, see RN Note). pt wants to leave hospital AMA lazaro: At the time of discharge this patient demonstrated full faculties medical capacity and judgement.  In my conversation with this patient they demonstrated the followin. This patient demonstrated their ability to express and communicate their choice to leave the emergency department against medical advice and to refuse further medical care.  2. This patient demonstrated the ability to understand relevant information regarding their diagnosis including the purpose of recommended treatment for their stated medical condition.  The paitent remembered this information and demonstrated that they were part of the decision making process in the cousre of their care.  3. This patient appreciated the significance of their medical condition, their diagnosis, and the consequences for leaving the emergency department against medical advice and refusing further medical treatment.  This patient demonstrated clear understanding of the benefits of further evaluation and treatment.  This patient demonstrated clear understanding of the risks of leaving against medical advice and refusing further medical treatment that include injury, illness, permenant disability, and death.   4.  This patient demonstrated the ability to manipulate inforamtion regarding their medical condition, their decision to leave the emergency department against medical advice, and their decision to refuse further medical care.  The patient demonstrated appropriate reasoning and intact logical thought processes during our conversation and was able to weigh the risks and benefits of receiving further medical care. lazaro :discussed case with dr leonard ent who knows pt well. states airway findings are chronic based on prior laryngoscope & pt refused treatment. pt also has severe hypothyroidism & refuses tx. pt still refusing to take steroids lazaro: still refusing steroids & transfer for ent (witnessed by URI kevin, see RN Note)

## 2021-09-09 NOTE — ED PROVIDER NOTE - CLINICAL SUMMARY MEDICAL DECISION MAKING FREE TEXT BOX
On exam, pt with +left-sided facial swelling around parotid gland, +left-ear swelling, red-hot to touch, with TTP. Likely due to cellulitis. Will evaluate for parotitis vs. abscess. Will admit since pt hypoxic. Pt without chest pain, SOB, but will check d-dimer. On exam, pt with +left-sided facial swelling around parotid gland, +left-ear swelling, red-hot to touch, with TTP. Likely due to cellulitis. Will evaluate for parotitis vs. abscess. Will admit since pt hypoxic. Pt without chest pain, SOB. age adjused d d-maricruz wnl. reassess for disposition

## 2021-09-09 NOTE — ED STATDOCS - PROGRESS NOTE DETAILS
Anjel VIDAL for Dr. Lugo: 59 y/o female with a PMHx of Hashimoto's disease, presents to the ED c/o swelling to L face since this morning. Pt was seen at Urgent Care and sent to the ED for further eval for low O2 sat. Pt relates she is unable to receive medication for Hashimotos. Pt also noticing her "voice is deeper". No fever. PMD: Dr. Sams. Will send pt to main ED for further evaluation.

## 2021-09-09 NOTE — ED PROVIDER NOTE - NSFOLLOWUPINSTRUCTIONS_ED_ALL_ED_FT
FOLLOW UP WITH PMD & ENT Dr Mcqueen WITHIN 1-2DAYS, CALL TO MAKE APPOINTMENT  COME BACK TO ED IF YOUR CONDITION WORSENS OR IF YOU DEVELOP FEVER GREATER THAN 100.4F, CHEST PAIN,  SHORTNESS OF BREATH OR ANY OTHER SYMPTOMS CONCERNING TO YOU  TAKE TYLENOL (ACETAMINOPHEN) 650 MG EVERY 6 HOURS AS NEEDED FOR PAIN    IF YOU WERE PRESRCIBED ANY MEDICATIONS FROM TODAY'S VISIT, TAKE THEM AS DIRECTED (clindamycin & decadron)

## 2021-09-09 NOTE — ED PROVIDER NOTE - OBJECTIVE STATEMENT
59 y/o female with PMHx of Hashimoto's disease presents to the ED  Allergic to Augmentin, Cipro, Diflucan, Macrobid, Prednisone. PCP: Dr. Lenore Sams. Pertinent HPI/PMH/PSH/FHx/SHx and Review of Systems contained within  HPI:  Patient p/w CC   x  days, new onset vs acute on chronic.   Allergic to Augmentin, Cipro, Diflucan, Macrobid, Prednisone. PCP: Dr. Lenore Sams.  PMH/PSH relevant for: Hashimoto's disease  ROS negative for: fever, Chest pain, SOB, Nausea, vomiting, diarrhea, abdominal pain, dysuria    FamilyHx and SocialHx not otherwise contributory Pertinent HPI/PMH/PSH/FHx/SHx and Review of Systems contained within  HPI:  Patient p/w CC +left-sided facial swelling, redness and pain x1 day, new onset. Went to  PTA, where she was prescribed Clindamycin but sent to ED for CAT scan. Of note, pt also has chronic RUIZ, and O2 sat on RA 88%. Denies SOB, chest pain.   Allergic to Augmentin, Cipro, Diflucan, Macrobid, Prednisone. PCP: Dr. Lenore Sams.  PMH/PSH relevant for: Hashimoto's disease  ROS negative for: fever, Chest pain, SOB, Nausea, vomiting, diarrhea, abdominal pain, dysuria    FamilyHx and SocialHx not otherwise contributory Pertinent HPI/PMH/PSH/FHx/SHx and Review of Systems contained within  HPI:  Patient p/w CC +left-sided facial swelling, redness and pain x1 day, new onset. Went to  PTA, where she was prescribed Clindamycin but sent to ED for CAT scan. Of note, pt also has chronic RUIZ, and O2 sat on RA 88%. Denies SOB, chest pain.   Allergic to Augmentin, Cipro, Diflucan, Macrobid, Prednisone. PCP: Dr. Lenore Sams.  PMH/PSH relevant for: Hashimoto's disease & hypothyroidism  ROS negative for: fever, Chest pain, SOB, Nausea, vomiting, diarrhea, abdominal pain, dysuria    FamilyHx and SocialHx not otherwise contributory

## 2021-09-09 NOTE — ED ADULT NURSE NOTE - EXPLANATION OF PATIENT'S REASON FOR LEAVING
does not want to be treated for her medical conditions nor does she want to be transffered out to another hospital to help care for her special needs

## 2021-09-09 NOTE — ED PROVIDER NOTE - PATIENT PORTAL LINK FT
You can access the FollowMyHealth Patient Portal offered by [facility] by registering at the following website: [email or web address]. By joining [employer]’s FollowMyHealth portal, you will also be able to view your health information using other applications (apps) compatible with our system.

## 2021-09-09 NOTE — ED PROVIDER NOTE - PHYSICAL EXAMINATION
.pe *GEN: No acute distress, well appearing   *HEAD: Normocephalic, Atraumatic  *EYES/NOSE: b/l Pupils symmetric & Reactive to ligth, EOMI b/l  *THROAT: airway patent, moist mucous membranes  *NECK: Neck supple  *PULMONARY: No Respiratory distress, symmetric b/l chest rise  *CARDIAC: s1s2, regular rhythm   *ABDOMEN:  Non Tender, Non Distended, soft, no guarding, no rebound, no masses   *BACK: no CVA tenderness, No midline vertebral tenderness to palpation   *EXTREMITIES: symmetric pulses, 2+ DP & radial pulses, no cyanosis, no edema   *SKIN: no rash, no bruising   *NEUROLOGIC: alert,  full active & passive ROM in all 4 extremities,   *PSYCH: appropriate concern about symptoms, pleasant *GEN: No acute distress, well appearing   *HEAD: Normocephalic, Atraumatic. +left-sided facial swelling around parotid gland, +left-ear swelling, red-hot to touch, with TTP  *EYES/NOSE: b/l Pupils symmetric & Reactive to ligth, EOMI b/l  *THROAT: airway patent, moist mucous membranes  *NECK: Neck supple  *PULMONARY: No Respiratory distress, symmetric b/l chest rise  *CARDIAC: s1s2, regular rhythm   *ABDOMEN:  Non Tender, Non Distended, soft, no guarding, no rebound, no masses   *BACK: no CVA tenderness, No midline vertebral tenderness to palpation   *EXTREMITIES: symmetric pulses, 2+ DP & radial pulses, no cyanosis, no edema   *SKIN: no rash, no bruising   *NEUROLOGIC: alert,  full active & passive ROM in all 4 extremities,   *PSYCH: appropriate concern about symptoms, pleasant

## 2021-09-09 NOTE — ED ADULT NURSE REASSESSMENT NOTE - NS ED NURSE REASSESS COMMENT FT1
pt is a&0x4, v/s are stable for the moment and is refusing steroid medications. authored RN, and MD Butts spoke with pt regarding medication interventions that can help pt get life saving interventions, pt is refusing her medications states that she does "not like the way they make me feel." "they make be depressed" MD Butts stated to pt that she needs to "get transferred out to a hospital that Specialize with Ears and throat" treatment to help with "her swollen throat.," because it could possibly start to "close up" and her oxygen will get low and she could "die." and "swollen face. MD Butts stated that she needs these interventions because if she does not get them she could die. pt states "look Im a , Im not taking these medications, and I am not going to another hospital." Pt states "Ill go home  then"  MD Butts filled out AMA forms, I witnessed pt refusing all necessary treatments to help save the pt's life. Charge RN Marie Arreola aware.

## 2021-09-09 NOTE — ED ADULT NURSE REASSESSMENT NOTE - NS ED NURSE REASSESS COMMENT FT1
went over d/c instructions with pt, pt understood instructions regarding continuing to take her antibiotic clindamycin, and decadron she states " I will take the antibiotic, but I will not take the steroid, she understood that she has to follow up with PMD and ENT specialist, and should she have any worsening s/s of fever, SOB, chest pain etc, she should immediately come back to the emergency room. pt signed d/c instructions and stated she understood instructions.

## 2021-09-09 NOTE — ED ADULT NURSE REASSESSMENT NOTE - NS ED NURSE REASSESS COMMENT FT1
Patient is refusing all medications, and refusing transfer to another facility to better care for her respiratory needs. Patient educated multiple times by MD Butts about risks and benefits of treatment and transfer. Pt still refuses and would like to leave.

## 2021-09-09 NOTE — ED PROVIDER NOTE - NS ED ROS FT
.ros Review of Systems:  	•	CONSTITUTIONAL: no fever  	•	SKIN: no rash  	•	RESPIRATORY: no shortness of breath  	•	CARDIAC: no chest pain  	•	GI:  no abd pain, no nausea, no vomiting, no diarrhea  	•	GENITO-URINARY:  no dysuria  	•	MUSCULOSKELETAL:  no back pain  	•	NEUROLOGIC: no weakness  	•	ALLERGY: no rhinorrhea  	•	PSYSCHIATRIC: appropriate concern about symptoms Review of Systems:  	•	CONSTITUTIONAL: no fever  	•	SKIN: no rash  	•	RESPIRATORY: no shortness of breath. +dyspnea on exertion (chronic)  	•	CARDIAC: no chest pain  	•	GI:  no abd pain, no nausea, no vomiting, no diarrhea  	•	GENITO-URINARY:  no dysuria  	•	MUSCULOSKELETAL:  no back pain. +left-sided facial swelling, redness and pain   	•	NEUROLOGIC: no weakness  	•	ALLERGY: no rhinorrhea  	•	PSYSCHIATRIC: appropriate concern about symptoms

## 2021-09-09 NOTE — ED PROVIDER NOTE - CARE PLAN
1 Principal Discharge DX:	Swelling of both vocal cords  Secondary Diagnosis:	Otitis externa  Secondary Diagnosis:	Facial cellulitis  Secondary Diagnosis:	Hypothyroidism

## 2021-09-09 NOTE — ED ADULT TRIAGE NOTE - CHIEF COMPLAINT QUOTE
PT. c/o of swollen glands and ear pain X1 day. Pt. stated she went to urgent care today, sent her to ED for low oxygen level. Oxygen level taken at triage, 89% on RA. Pt. denies increased SOB, pt. at baseline.

## 2021-09-10 LAB
T3 SERPL-MCNC: <20 NG/DL — LOW (ref 80–200)
T4 AB SER-ACNC: <0.4 UG/DL — LOW (ref 4.6–12)

## 2021-09-11 NOTE — ED POST DISCHARGE NOTE - DETAILS
Pt states she is aware, had been seeing an endocrinologist for thyroid, but was unhappy with care. Now currently planning to follow up with a new endocrinologist. - Juan M Garcia PA-C

## 2021-09-15 LAB
CULTURE RESULTS: SIGNIFICANT CHANGE UP
SPECIMEN SOURCE: SIGNIFICANT CHANGE UP

## 2022-04-17 ENCOUNTER — EMERGENCY (EMERGENCY)
Facility: HOSPITAL | Age: 62
LOS: 0 days | Discharge: HOME | End: 2022-04-17
Attending: EMERGENCY MEDICINE
Payer: MEDICAID

## 2022-04-17 VITALS
HEART RATE: 76 BPM | DIASTOLIC BLOOD PRESSURE: 96 MMHG | SYSTOLIC BLOOD PRESSURE: 121 MMHG | TEMPERATURE: 99 F | OXYGEN SATURATION: 95 % | RESPIRATION RATE: 16 BRPM

## 2022-04-17 VITALS — WEIGHT: 143.08 LBS | HEIGHT: 56 IN

## 2022-04-17 DIAGNOSIS — Z90.710 ACQUIRED ABSENCE OF BOTH CERVIX AND UTERUS: ICD-10-CM

## 2022-04-17 DIAGNOSIS — N93.9 ABNORMAL UTERINE AND VAGINAL BLEEDING, UNSPECIFIED: ICD-10-CM

## 2022-04-17 DIAGNOSIS — N89.8 OTHER SPECIFIED NONINFLAMMATORY DISORDERS OF VAGINA: ICD-10-CM

## 2022-04-17 DIAGNOSIS — E06.3 AUTOIMMUNE THYROIDITIS: ICD-10-CM

## 2022-04-17 DIAGNOSIS — Z88.1 ALLERGY STATUS TO OTHER ANTIBIOTIC AGENTS STATUS: ICD-10-CM

## 2022-04-17 DIAGNOSIS — B37.3 CANDIDIASIS OF VULVA AND VAGINA: ICD-10-CM

## 2022-04-17 DIAGNOSIS — Z88.8 ALLERGY STATUS TO OTHER DRUGS, MEDICAMENTS AND BIOLOGICAL SUBSTANCES STATUS: ICD-10-CM

## 2022-04-17 LAB
APPEARANCE UR: CLEAR — SIGNIFICANT CHANGE UP
BILIRUB UR-MCNC: NEGATIVE — SIGNIFICANT CHANGE UP
COLOR SPEC: YELLOW — SIGNIFICANT CHANGE UP
DIFF PNL FLD: ABNORMAL
GLUCOSE UR QL: NEGATIVE — SIGNIFICANT CHANGE UP
KETONES UR-MCNC: NEGATIVE — SIGNIFICANT CHANGE UP
LEUKOCYTE ESTERASE UR-ACNC: ABNORMAL
NITRITE UR-MCNC: NEGATIVE — SIGNIFICANT CHANGE UP
PH UR: 6 — SIGNIFICANT CHANGE UP (ref 5–8)
PROT UR-MCNC: NEGATIVE — SIGNIFICANT CHANGE UP
SP GR SPEC: 1.02 — SIGNIFICANT CHANGE UP (ref 1.01–1.02)
UROBILINOGEN FLD QL: NEGATIVE — SIGNIFICANT CHANGE UP

## 2022-04-17 PROCEDURE — 99284 EMERGENCY DEPT VISIT MOD MDM: CPT

## 2022-04-17 PROCEDURE — 81001 URINALYSIS AUTO W/SCOPE: CPT

## 2022-04-17 PROCEDURE — 87086 URINE CULTURE/COLONY COUNT: CPT

## 2022-04-17 NOTE — ED ADULT NURSE NOTE - OBJECTIVE STATEMENT
patient axox3, c/o vaginal itching and vaginal discharge. patient states she thinks he has a UTI and a yeast infection. patient states she recently had a negative test for STDs. patient states that she has had allergic reactions to medications prescribed for yeast infections such as Macrobid and Diflucan in the past. denies hematuia. patient denies headache, vision changes, n/v/d, fever, chills, cough, chest pain, SOB. color good, skin warm and dry, respirations even and unlabored. patient able to speak in full sentences. patient able to ambulate independently with a steady gait while maintaining safety.

## 2022-04-17 NOTE — ED STATDOCS - OBJECTIVE STATEMENT
Patient:   ADI FISHER            MRN: [id number]            FIN: [id number]               Age:   63 years     Sex:  MALE     :  56   Associated Diagnoses:   None   Author:   ERROL BELL      Postoperative Information      Postoperative Information   Postoperative Information:          Preoperative Diagnosis:    Periphral Vascular Disease  .         Postoperative Diagnosis: Same As Pre-Op Dx.         Performed by:    MIKAYLA WASHBURN (Surgeon - Primary)  .         Assistant:    ERROL BELL (Resident)  .         Procedures Performed:    HYBRID Iliac Artery Angioplasty, Right Aorto Iliac Angiogram, right Illiac angioplasty, right common illiac stent, right external illiac stent  .         Findings: Refer to dictated op report.         Specimens Removed: None.         Estimated Blood Loss:    minimal.         Blood Administered: No.         Complications: None.         Grafts/Implants: None, R NEO and EIA stent.    Anesthetic utilized:     RUDI DANIEL, JUAN LUIS LONDONO (Supervisor)  .             Electronically Signed On 2017 17:10  __________________________________________________   ERROL BELL      Electronically Signed On 2017 10:07  __________________________________________________   MIKAYLA WASHBURN     61 F hx hashimoto's disease, s/p hysterectomy, hemorrhoids  here c/o vaginal itching and vaginal discharge. pt state she thinks he has a UTI and a yeast infection.  pt also concerned she has hemorrhoids. reports she has been evaluated by OBGYN and been tested for STDs. pt states that she has had allergic reactions to medications prescribed for yeast infections such as Macrobid and Diflucan.  OBGYN Dr. Peña; PMD Dr. Sams 61 F hx hashimoto's disease, s/p hysterectomy, hemorrhoids  here c/o vaginal itching and vaginal discharge. pt state she thinks he has a UTI and a yeast infection.  pt also concerned she has hemorrhoids with some bleeding. reports she has been evaluated by OBGYN and been tested for STDs. pt states that she has had allergic reactions to medications prescribed for yeast infections such as Macrobid and Diflucan.  OBGYN Dr. Peña; PMD Dr. Sams

## 2022-04-17 NOTE — ED STATDOCS - CLINICAL SUMMARY MEDICAL DECISION MAKING FREE TEXT BOX
61 F presents to ED with concern of UTI and yeast infection. plan: check urine and pelvic exam 61 F presents to ED with concern of UTI and yeast infection. plan: check urine and pelvic exam  Pt with allergies to multiple medications.  D/w pt importance and need to follow up with ob/gyn and PMD.

## 2022-04-17 NOTE — ED STATDOCS - NSFOLLOWUPINSTRUCTIONS_ED_ALL_ED_FT
Use the Terconazole intravaginally        Vaginal Yeast Infection, Adult       Vaginal yeast infection is a condition that causes vaginal discharge as well as soreness, swelling, and redness (inflammation) of the vagina. This is a common condition. Some women get this infection frequently.      What are the causes?    This condition is caused by a change in the normal balance of the yeast (candida) and bacteria that live in the vagina. This change causes an overgrowth of yeast, which causes the inflammation.      What increases the risk?    The condition is more likely to develop in women who:  •Take antibiotic medicines.      •Have diabetes.      •Take birth control pills.      •Are pregnant.      •Douche often.      •Have a weak body defense system (immune system).      •Have been taking steroid medicines for a long time.      •Frequently wear tight clothing.        What are the signs or symptoms?    Symptoms of this condition include:  •White, thick, creamy vaginal discharge.      •Swelling, itching, redness, and irritation of the vagina. The lips of the vagina (vulva) may be affected as well.      •Pain or a burning feeling while urinating.      •Pain during sex.        How is this diagnosed?    This condition is diagnosed based on:  •Your medical history.      •A physical exam.      •A pelvic exam. Your health care provider will examine a sample of your vaginal discharge under a microscope. Your health care provider may send this sample for testing to confirm the diagnosis.        How is this treated?    This condition is treated with medicine. Medicines may be over-the-counter or prescription. You may be told to use one or more of the following:  •Medicine that is taken by mouth (orally).      •Medicine that is applied as a cream (topically).      •Medicine that is inserted directly into the vagina (suppository).        Follow these instructions at home:     Lifestyle     • Do not have sex until your health care provider approves. Tell your sex partner that you have a yeast infection. That person should go to his or her health care provider and ask if they should also be treated.      • Do not wear tight clothes, such as pantyhose or tight pants.      •Wear breathable cotton underwear.      General instructions     •Take or apply over-the-counter and prescription medicines only as told by your health care provider.      •Eat more yogurt. This may help to keep your yeast infection from returning.      • Do not use tampons until your health care provider approves.      •Try taking a sitz bath to help with discomfort. This is a warm water bath that is taken while you are sitting down. The water should only come up to your hips and should cover your buttocks. Do this 3–4 times per day or as told by your health care provider.      • Do not douche.      •If you have diabetes, keep your blood sugar levels under control.      •Keep all follow-up visits as told by your health care provider. This is important.        Contact a health care provider if:    •You have a fever.      •Your symptoms go away and then return.      •Your symptoms do not get better with treatment.      •Your symptoms get worse.      •You have new symptoms.      •You develop blisters in or around your vagina.      •You have blood coming from your vagina and it is not your menstrual period.      •You develop pain in your abdomen.

## 2022-04-17 NOTE — ED STATDOCS - PATIENT PORTAL LINK FT
You can access the FollowMyHealth Patient Portal offered by [facility] by registering at the following website: [email or web address]. By joining [email or web address]’s FollowMyHealth portal, you will also be able to view your health information using other applications (apps) compatible with our system.

## 2022-04-17 NOTE — ED STATDOCS - ATTENDING CONTRIBUTION TO CARE
I, Kelsey Chavez MD, personally saw the patient with the resident, and completed the key components of the history and physical exam. I then discussed the management plan with the resident.

## 2022-04-18 LAB
CULTURE RESULTS: SIGNIFICANT CHANGE UP
SPECIMEN SOURCE: SIGNIFICANT CHANGE UP

## 2022-04-19 NOTE — ED POST DISCHARGE NOTE - OTHER
pt called for results, advised her of results, pt states she is feeling better, will f/u with GYN Delma Funes PA-C

## 2022-04-19 NOTE — ED POST DISCHARGE NOTE - RESULT SUMMARY
wo 10K-49K aerococcus. Not a significant amount of bacteria that requires treatment. -Jc Ruffin PA-C

## 2022-05-04 ENCOUNTER — OUTPATIENT (OUTPATIENT)
Dept: OUTPATIENT SERVICES | Facility: HOSPITAL | Age: 62
LOS: 1 days | End: 2022-05-04
Payer: MEDICAID

## 2022-05-04 ENCOUNTER — APPOINTMENT (OUTPATIENT)
Dept: MAMMOGRAPHY | Facility: CLINIC | Age: 62
End: 2022-05-04
Payer: MEDICAID

## 2022-05-04 DIAGNOSIS — Z00.8 ENCOUNTER FOR OTHER GENERAL EXAMINATION: ICD-10-CM

## 2022-05-04 PROCEDURE — 77063 BREAST TOMOSYNTHESIS BI: CPT

## 2022-05-04 PROCEDURE — 77067 SCR MAMMO BI INCL CAD: CPT | Mod: 26

## 2022-05-04 PROCEDURE — 77063 BREAST TOMOSYNTHESIS BI: CPT | Mod: 26

## 2022-05-04 PROCEDURE — 77067 SCR MAMMO BI INCL CAD: CPT

## 2022-07-05 ENCOUNTER — OUTPATIENT (OUTPATIENT)
Dept: OUTPATIENT SERVICES | Facility: HOSPITAL | Age: 62
LOS: 1 days | End: 2022-07-05
Payer: MEDICAID

## 2022-07-05 ENCOUNTER — APPOINTMENT (OUTPATIENT)
Dept: ULTRASOUND IMAGING | Facility: CLINIC | Age: 62
End: 2022-07-05

## 2022-07-05 DIAGNOSIS — E04.1 NONTOXIC SINGLE THYROID NODULE: ICD-10-CM

## 2022-07-05 DIAGNOSIS — E03.9 HYPOTHYROIDISM, UNSPECIFIED: ICD-10-CM

## 2022-07-05 PROCEDURE — 76536 US EXAM OF HEAD AND NECK: CPT | Mod: 26

## 2022-07-05 PROCEDURE — 76536 US EXAM OF HEAD AND NECK: CPT

## 2022-08-18 ENCOUNTER — APPOINTMENT (OUTPATIENT)
Dept: ENDOCRINOLOGY | Facility: CLINIC | Age: 62
End: 2022-08-18

## 2022-08-18 VITALS
SYSTOLIC BLOOD PRESSURE: 120 MMHG | HEART RATE: 86 BPM | HEIGHT: 56 IN | DIASTOLIC BLOOD PRESSURE: 78 MMHG | OXYGEN SATURATION: 96 % | WEIGHT: 138 LBS | TEMPERATURE: 97.5 F | BODY MASS INDEX: 31.05 KG/M2 | RESPIRATION RATE: 16 BRPM

## 2022-08-18 DIAGNOSIS — H91.90 UNSPECIFIED HEARING LOSS, UNSPECIFIED EAR: ICD-10-CM

## 2022-08-18 DIAGNOSIS — E03.9 HYPOTHYROIDISM, UNSPECIFIED: ICD-10-CM

## 2022-08-18 PROCEDURE — 99204 OFFICE O/P NEW MOD 45 MIN: CPT

## 2022-08-18 RX ORDER — THYROID, PORCINE 15 MG/1
15 TABLET ORAL DAILY
Qty: 90 | Refills: 1 | Status: ACTIVE | COMMUNITY
Start: 2022-08-18 | End: 1900-01-01

## 2022-08-18 NOTE — HISTORY OF PRESENT ILLNESS
[FreeTextEntry1] : Umm who I was asked to see in consultation for evaluation  hypothyroidism.\par Per patient she has had thyroid issues for 22 years.\par Looks like she has seen many physicians and endocrinologist, knows all risks of untreated hypothyroidism.\par She has  tried a lot of thyroid replacement but she says she  could not tolerate any .\par She says she felt worse while she was on thyroid hormone replacement, she would prefer not being treated than being on it.\par She has been on levothyroxine, synthroid, unithroid, cytomel and does not want to be on anything.\par She has swelling under eyes, that’s not new, she says she has had it for long time.\par Has family h/o thyroid disorder, both sister have hypothyroidism.\par She is concerned and wants to be screened for medullary thyroid cancer, asking about it.\par She says she heard a talk about it on radio and she thinks she has all symptoms of it.\par She has no family h/o thyroid cancer, recent thyroid US did not show any nodules.\par She had some nodules in previous US in 2017, per patient had FNA , were benign.\par Energy is low, face swollen,dry skin, no weight changes .Denies heat or cold intolerance, no weight changes, no constipation or diarrhea, no palpitations, energy level fair, no skin or hair changes.\par \par

## 2022-08-18 NOTE — REVIEW OF SYSTEMS
[Fatigue] : fatigue [Decreased Appetite] : appetite not decreased [Recent Weight Gain (___ Lbs)] : no recent weight gain [Recent Weight Loss (___ Lbs)] : no recent weight loss [Visual Field Defect] : no visual field defect [Dry Eyes] : no dryness [Eye Pain] : no pain [Dysphagia] : no dysphagia [Neck Pain] : no neck pain [Dysphonia] : no dysphonia [Chest Pain] : no chest pain [Palpitations] : no palpitations [Lower Ext Edema] : no lower extremity edema [Shortness Of Breath] : no shortness of breath [Cough] : no cough [Orthopnea] : no orthopnea [Nausea] : no nausea [Constipation] : no constipation [Abdominal Pain] : no abdominal pain [Vomiting] : no vomiting [Diarrhea] : no diarrhea [Polyuria] : no polyuria [Dysuria] : no dysuria [Joint Pain] : no joint pain [Muscle Weakness] : no muscle weakness [Myalgia] : no myalgia  [Acanthosis] : no acanthosis  [Acne] : no acne [Dry Skin] : no dry skin [Headaches] : no headaches [Dizziness] : no dizziness [Tremors] : no tremors [Depression] : no depression [Insomnia] : no insomnia [Anxiety] : no anxiety [Polydipsia] : no polydipsia [Cold Intolerance] : no cold intolerance [Heat Intolerance] : no heat intolerance [Easy Bleeding] : no ~M tendency for easy bleeding [Easy Bruising] : no tendency for easy bruising

## 2022-08-18 NOTE — PHYSICAL EXAM
[employer] Interactive Patient Education © 2017 [employer] Alert] : alert [Well Nourished] : well nourished [No Acute Distress] : no acute distress [Normal Sclera/Conjunctiva] : normal sclera/conjunctiva [PERRL] : pupils equal, round and reactive to light [No Proptosis] : no proptosis [No Neck Mass] : no neck mass was observed [Supple] : the neck was supple [Thyroid Not Enlarged] : the thyroid was not enlarged [No Thyroid Nodules] : no palpable thyroid nodules [No Respiratory Distress] : no respiratory distress [No Accessory Muscle Use] : no accessory muscle use [Normal Rate and Effort] : normal respiratory rate and effort [Clear to Auscultation] : lungs were clear to auscultation bilaterally [Normal S1, S2] : normal S1 and S2 [No Murmurs] : no murmurs [Normal Rate] : heart rate was normal [Regular Rhythm] : with a regular rhythm [Normal Bowel Sounds] : normal bowel sounds [Not Tender] : non-tender [Soft] : abdomen soft [Normal Supraclavicular Nodes] : no supraclavicular lymphadenopathy [Normal Anterior Cervical Nodes] : no anterior cervical lymphadenopathy [Normal Posterior Cervical Nodes] : no posterior cervical lymphadenopathy [Normal Gait] : normal gait [No Clubbing, Cyanosis] : no clubbing  or cyanosis of the fingernails [No Joint Swelling] : no joint swelling seen [No Motor Deficits] : the motor exam was normal [No Tremors] : no tremors [Oriented x3] : oriented to person, place, and time [Normal Affect] : the affect was normal [Normal Insight/Judgement] : insight and judgment were intact [Normal Mood] : the mood was normal

## 2022-08-18 NOTE — ASSESSMENT
[FreeTextEntry1] : Umm who I was asked to see in consultation for evaluation  hypothyroidism.\par Per patient she has had thyroid issues for 22 years.\par Looks like she has seen many physicians and endocrinologist, knows all risks of untreated hypothyroidism.\par She has  tried a lot of thyroid replacement but she says she  could not tolerate any .\par She says she felt worse while she was on thyroid hormone replacement, she would prefer not being treated than being on it.\par She has been on levothyroxine, synthroid,unithroid,cytomel and does not want to be on anything.\par She has swollen face, dry skin, fatigue.\par Review of blood work shows TSH of 168 in 5/22, she says mostly hr TSH has been around 100s to 200s.\par She has seen many endocrinologists , per patient she understands the risk of mexademic coma.\par She says her quality of life is worse on this medication then not on it.\par Discussed other options, armor thyroid is an option.\par Discussed with patient  that armor thyroid is desiccated pig thyroid, its T4 and T3 composition is different from human thyroid which is more T4 and less T3 , however armour is more T3 and less T4. So people may at times feel symptoms to low T4.\par Also its dose is in mg where as real thyroid hormone doses are in Mcg, because armour thyroid is the amount of tissue and the real hormone can vary from one refill to another causing symptoms in patients every time they change the refills.\par However since she is not willing to try anthingn else, we can atleast try armor 15 mg daily  , see how she tolerates.\par Blood work now and in 3 months.

## 2023-01-05 ENCOUNTER — APPOINTMENT (OUTPATIENT)
Dept: ENDOCRINOLOGY | Facility: CLINIC | Age: 63
End: 2023-01-05

## 2023-01-30 NOTE — ED ADULT TRIAGE NOTE - SOURCE OF INFORMATION
Post-Op Assessment Note    CV Status:  Stable  Pain Score: 0    Pain management: adequate     Mental Status:  Awake and sleepy   Hydration Status:  Stable   PONV Controlled:  None   Airway Patency:  Patent      Post Op Vitals Reviewed: Yes      Staff: CRNA         No notable events documented      BP   114/61   Temp     Pulse  70   Resp   12   SpO2   99
Patient

## 2023-05-05 ENCOUNTER — APPOINTMENT (OUTPATIENT)
Dept: MAMMOGRAPHY | Facility: CLINIC | Age: 63
End: 2023-05-05

## 2023-05-09 ENCOUNTER — APPOINTMENT (OUTPATIENT)
Dept: MAMMOGRAPHY | Facility: CLINIC | Age: 63
End: 2023-05-09
Payer: MEDICAID

## 2023-05-09 ENCOUNTER — APPOINTMENT (OUTPATIENT)
Dept: ULTRASOUND IMAGING | Facility: CLINIC | Age: 63
End: 2023-05-09
Payer: MEDICAID

## 2023-05-09 ENCOUNTER — OUTPATIENT (OUTPATIENT)
Dept: OUTPATIENT SERVICES | Facility: HOSPITAL | Age: 63
LOS: 1 days | End: 2023-05-09
Payer: MEDICAID

## 2023-05-09 DIAGNOSIS — Z12.31 ENCOUNTER FOR SCREENING MAMMOGRAM FOR MALIGNANT NEOPLASM OF BREAST: ICD-10-CM

## 2023-05-09 PROCEDURE — 77067 SCR MAMMO BI INCL CAD: CPT | Mod: 26

## 2023-05-09 PROCEDURE — 76536 US EXAM OF HEAD AND NECK: CPT | Mod: 26

## 2023-05-09 PROCEDURE — 76536 US EXAM OF HEAD AND NECK: CPT

## 2023-05-09 PROCEDURE — 77063 BREAST TOMOSYNTHESIS BI: CPT | Mod: 26

## 2023-05-09 PROCEDURE — 77063 BREAST TOMOSYNTHESIS BI: CPT

## 2023-05-09 PROCEDURE — 77067 SCR MAMMO BI INCL CAD: CPT

## 2023-07-24 ENCOUNTER — APPOINTMENT (OUTPATIENT)
Dept: DERMATOLOGY | Facility: CLINIC | Age: 63
End: 2023-07-24
Payer: MEDICAID

## 2023-07-24 ENCOUNTER — NON-APPOINTMENT (OUTPATIENT)
Age: 63
End: 2023-07-24

## 2023-07-24 DIAGNOSIS — B00.9 HERPESVIRAL INFECTION, UNSPECIFIED: ICD-10-CM

## 2023-07-24 DIAGNOSIS — B97.7 PAPILLOMAVIRUS AS THE CAUSE OF DISEASES CLASSIFIED ELSEWHERE: ICD-10-CM

## 2023-07-24 DIAGNOSIS — Q15.9 CONGENITAL MALFORMATION OF EYE, UNSPECIFIED: ICD-10-CM

## 2023-07-24 PROCEDURE — 99204 OFFICE O/P NEW MOD 45 MIN: CPT

## 2023-07-24 RX ORDER — BETAMETHASONE DIPROPIONATE 0.5 MG/G
0.05 OINTMENT TOPICAL TWICE DAILY
Qty: 1 | Refills: 3 | Status: ACTIVE | COMMUNITY
Start: 2023-07-24 | End: 1900-01-01

## 2023-07-24 RX ORDER — ACYCLOVIR 400 MG/1
400 TABLET ORAL 3 TIMES DAILY
Qty: 30 | Refills: 0 | Status: ACTIVE | COMMUNITY
Start: 2023-07-24 | End: 1900-01-01

## 2023-07-24 NOTE — PHYSICAL EXAM
[FreeTextEntry3] : AAOx3, pleasant, NAD, no visual lymphadenopathy\par hair, scalp, face, nose, eyelids, ears, lips, oropharynx, neck, chest, abdomen, back, right arm, left arm, nails, and hands examined with all normal findings,\par pertinent findings include:\par \par swelling under right eye\par punched out erosions on labia and vagina

## 2023-07-24 NOTE — ASSESSMENT
[FreeTextEntry1] : rash\par favor HSV\par swab taken\par unable to tolerate valtrex\par trial of acyclovir 400 mg PO TID x10 days; SED\par ?early LS&A; trial of betamethasone ointment BID x2 weeks; SED\par \par eye swelling\par surgical follow up needed\par \par f/u 1 month; prefers female provider\par \par nurse fozia in room for full visit

## 2023-07-24 NOTE — HISTORY OF PRESENT ILLNESS
[FreeTextEntry1] : rash [de-identified] : 62 year old with rash in genitals; diagnosed as HSV previously. did not do well with valtrex.\par swelling under right eye.

## 2023-07-25 NOTE — DISCUSSION/SUMMARY
Scheduled Via: Dr. Nolan  Procedure date: 8/17/23  Procedure time: 9:30am ;If facility doesn't give time, did patient request a specific time? No; If so, what time was requested? N/A  Rep Contacted?: No     The following have been confirmed:  Insurance name confirmed as , will be the same at time of procedure?: Yes  Insurance Accepted at Facility? Yes     Patient was scheduled with MA in the office  Prep required? Yes, patient was given letter in the office and prep was sent to preferred pharmacy.       [FreeTextEntry1] : Ms. Burns is a 59 year old woman with a history of difficulty with balance. She also reports chronic tinnitus and hearing loss.\par She has a history of severe thyroidism.\par \par On examination she does have a wide based, slightly unsteady gait.\par MRI brain and IACs with and without contrast does show marked fluid within the bilateral mastoids. There is mild periventricular white matter ischemia, but no other evidence of intracranial pathology.\par \par -Needs treatment of severe hypothyroidism.\par \par -She would like to see a chiropractor. I advised her to avoid chiropractic manipulations of the neck as there is a risk of arterial dissection and stroke.\par -Will get spine x-ray for history of possible scoliosis and back pain.\par f/u in 3 months, sooner if needed

## 2023-07-31 DIAGNOSIS — R21 RASH AND OTHER NONSPECIFIC SKIN ERUPTION: ICD-10-CM

## 2023-07-31 RX ORDER — TRIAMCINOLONE ACETONIDE 1 MG/G
0.1 OINTMENT TOPICAL TWICE DAILY
Qty: 1 | Refills: 1 | Status: ACTIVE | COMMUNITY
Start: 2023-07-31 | End: 1900-01-01

## 2023-08-31 ENCOUNTER — APPOINTMENT (OUTPATIENT)
Dept: DERMATOLOGY | Facility: CLINIC | Age: 63
End: 2023-08-31
Payer: MEDICAID

## 2023-08-31 PROCEDURE — 99215 OFFICE O/P EST HI 40 MIN: CPT

## 2023-08-31 RX ORDER — HALOBETASOL PROPIONATE 0.5 MG/G
0.05 OINTMENT TOPICAL
Qty: 1 | Refills: 1 | Status: ACTIVE | COMMUNITY
Start: 2023-08-31 | End: 1900-01-01

## 2023-08-31 RX ORDER — FLUCONAZOLE 200 MG/1
200 TABLET ORAL
Qty: 2 | Refills: 0 | Status: ACTIVE | COMMUNITY
Start: 2023-08-31 | End: 1900-01-01

## 2023-08-31 RX ORDER — KETOCONAZOLE 20 MG/G
2 CREAM TOPICAL
Qty: 1 | Refills: 2 | Status: ACTIVE | COMMUNITY
Start: 2023-08-31 | End: 1900-01-01

## 2023-08-31 RX ORDER — MUPIROCIN 20 MG/G
2 OINTMENT TOPICAL
Qty: 1 | Refills: 3 | Status: ACTIVE | COMMUNITY
Start: 2023-08-31 | End: 1900-01-01

## 2023-09-08 NOTE — ASSESSMENT
[FreeTextEntry1] : #Vulvar dermatitis, very painful - chronic; exacerbation - Potential DDX discussed with patient - Start mupirocin ointment + ketoconazole cream BID for 2 weeks, including vulva and skin folds.  - After 2 weeks, start halobetasol ointment BID for 1 month - Take PO fluconazole 200mg once, repeat again in 1 week  Photo in chart RTC 6 weeks

## 2023-09-08 NOTE — HISTORY OF PRESENT ILLNESS
[FreeTextEntry1] : rash [de-identified] : JACK WOODS is a 62 year old F who present for f/u as below.   Problem: rash Location: groin Duration: 1.5 years Associated symptoms/Aggravating Factors: itchy, painful, bleeding - +HSV2 IgG tiers - 5/2022 PAP and HPV test +HPV16 but negative for HPV18 - 5/2022: cervical biopsy negative Modifying factors/Treatments: pt did not do well with Valtrex, and notes "side effects" with diflucan - July 2023:  Switched to acyclovir x10 days by Dr. Zamora. Thought to have LS&A as well, Rx'ed betamethasone (not covered) so switched to triamcinolone 0.1% ointment.  - Aug 2023: pt notes she only used triamcinolone once, bc it was burning with use (worse than baseline). Finished acyclovir course  No other changing or concerning lesions.  No itchy, growing, bleeding, painful or changing moles.   Personal hx of skin cancer: no Social hx: hard of hearing,

## 2023-09-08 NOTE — PHYSICAL EXAM
[Alert] : alert [Oriented x 3] : ~L oriented x 3 [Well Nourished] : well nourished [Conjunctiva Non-injected] : conjunctiva non-injected [Declined] : declined [FreeTextEntry3] : Focused exam only (see below) per patient request:  painful sclerotic plaques of labia minora, clitoral pierce with red erosions pink patches L inguinal fold

## 2023-09-15 NOTE — ED ADULT TRIAGE NOTE - HEIGHT IN CM
142.24 Siliq Counseling:  I discussed with the patient the risks of Siliq including but not limited to new or worsening depression, suicidal thoughts and behavior, immunosuppression, malignancy, posterior leukoencephalopathy syndrome, and serious infections.  The patient understands that monitoring is required including a PPD at baseline and must alert us or the primary physician if symptoms of infection or other concerning signs are noted. There is also a special program designed to monitor depression which is required with Siliq.

## 2023-10-19 ENCOUNTER — APPOINTMENT (OUTPATIENT)
Dept: DERMATOLOGY | Facility: CLINIC | Age: 63
End: 2023-10-19
Payer: MEDICAID

## 2023-10-19 DIAGNOSIS — L30.9 DERMATITIS, UNSPECIFIED: ICD-10-CM

## 2023-10-19 PROCEDURE — 99214 OFFICE O/P EST MOD 30 MIN: CPT

## 2023-12-20 ENCOUNTER — OUTPATIENT (OUTPATIENT)
Dept: OUTPATIENT SERVICES | Facility: HOSPITAL | Age: 63
LOS: 1 days | End: 2023-12-20
Payer: MEDICAID

## 2023-12-20 DIAGNOSIS — E03.9 HYPOTHYROIDISM, UNSPECIFIED: ICD-10-CM

## 2023-12-20 DIAGNOSIS — S31.502A UNSPECIFIED OPEN WOUND OF UNSPECIFIED EXTERNAL GENITAL ORGANS, FEMALE, INITIAL ENCOUNTER: ICD-10-CM
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
## 2023-12-20 DIAGNOSIS — M17.12 UNILATERAL PRIMARY OSTEOARTHRITIS, LEFT KNEE: ICD-10-CM
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
## 2023-12-20 DIAGNOSIS — R87.821 VAGINAL LOW RISK HUMAN PAPILLOMAVIRUS (HPV) DNA TEST POSITIVE: ICD-10-CM
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
## 2023-12-20 DIAGNOSIS — Z72.4 INAPPROPRIATE DIET AND EATING HABITS: ICD-10-CM
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
## 2023-12-20 PROCEDURE — 99204 OFFICE O/P NEW MOD 45 MIN: CPT

## 2023-12-20 PROCEDURE — 17250 CHEM CAUT OF GRANLTJ TISSUE: CPT | Mod: 59

## 2023-12-20 PROCEDURE — 99202 OFFICE O/P NEW SF 15 MIN: CPT | Mod: 25

## 2023-12-27 ENCOUNTER — OUTPATIENT (OUTPATIENT)
Dept: OUTPATIENT SERVICES | Facility: HOSPITAL | Age: 63
LOS: 1 days | End: 2023-12-27
Payer: MEDICAID

## 2023-12-27 DIAGNOSIS — R87.821 VAGINAL LOW RISK HUMAN PAPILLOMAVIRUS (HPV) DNA TEST POSITIVE: ICD-10-CM

## 2023-12-27 DIAGNOSIS — A63.0 ANOGENITAL (VENEREAL) WARTS: ICD-10-CM

## 2023-12-27 DIAGNOSIS — S31.502A UNSPECIFIED OPEN WOUND OF UNSPECIFIED EXTERNAL GENITAL ORGANS, FEMALE, INITIAL ENCOUNTER: ICD-10-CM

## 2023-12-27 DIAGNOSIS — M17.12 UNILATERAL PRIMARY OSTEOARTHRITIS, LEFT KNEE: ICD-10-CM
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
## 2023-12-27 DIAGNOSIS — Z72.4 INAPPROPRIATE DIET AND EATING HABITS: ICD-10-CM
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
## 2023-12-27 DIAGNOSIS — E03.9 HYPOTHYROIDISM, UNSPECIFIED: ICD-10-CM

## 2023-12-27 PROCEDURE — 17250 CHEM CAUT OF GRANLTJ TISSUE: CPT

## 2023-12-27 PROCEDURE — 99213 OFFICE O/P EST LOW 20 MIN: CPT

## 2024-01-03 ENCOUNTER — OUTPATIENT (OUTPATIENT)
Dept: OUTPATIENT SERVICES | Facility: HOSPITAL | Age: 64
LOS: 1 days | End: 2024-01-03
Payer: MEDICAID
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
## 2024-01-03 DIAGNOSIS — Z72.4 INAPPROPRIATE DIET AND EATING HABITS: ICD-10-CM

## 2024-01-03 DIAGNOSIS — R87.821 VAGINAL LOW RISK HUMAN PAPILLOMAVIRUS (HPV) DNA TEST POSITIVE: ICD-10-CM

## 2024-01-03 DIAGNOSIS — M17.12 UNILATERAL PRIMARY OSTEOARTHRITIS, LEFT KNEE: ICD-10-CM

## 2024-01-03 DIAGNOSIS — S31.502A UNSPECIFIED OPEN WOUND OF UNSPECIFIED EXTERNAL GENITAL ORGANS, FEMALE, INITIAL ENCOUNTER: ICD-10-CM
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
## 2024-01-03 DIAGNOSIS — A63.0 ANOGENITAL (VENEREAL) WARTS: ICD-10-CM
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
## 2024-01-03 DIAGNOSIS — E03.9 HYPOTHYROIDISM, UNSPECIFIED: ICD-10-CM
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
## 2024-01-03 PROCEDURE — 99212 OFFICE O/P EST SF 10 MIN: CPT

## 2024-01-09 ENCOUNTER — APPOINTMENT (OUTPATIENT)
Dept: DERMATOLOGY | Facility: CLINIC | Age: 64
End: 2024-01-09

## 2024-07-02 NOTE — ED STATDOCS - GASTROINTESTINAL, MLM
This nurse placed patient on bipap and patient resting well.     Nurse to bedside for patient removing bipap and putting self back on 4LNC, d/t coughing up thick tan sputum.    Patient tolerated bipap for 10 mins. Dr Buck notified.   abdomen soft, non-tender, and non-distended. Bowel sounds present.

## 2024-07-09 ENCOUNTER — APPOINTMENT (OUTPATIENT)
Dept: MAMMOGRAPHY | Facility: CLINIC | Age: 64
End: 2024-07-09
Payer: MEDICAID

## 2024-07-09 ENCOUNTER — RESULT REVIEW (OUTPATIENT)
Age: 64
End: 2024-07-09

## 2024-07-09 ENCOUNTER — OUTPATIENT (OUTPATIENT)
Dept: OUTPATIENT SERVICES | Facility: HOSPITAL | Age: 64
LOS: 1 days | End: 2024-07-09
Payer: MEDICAID

## 2024-07-09 DIAGNOSIS — R94.5 ABNORMAL RESULTS OF LIVER FUNCTION STUDIES: ICD-10-CM

## 2024-07-09 DIAGNOSIS — E03.9 HYPOTHYROIDISM, UNSPECIFIED: ICD-10-CM

## 2024-07-09 PROCEDURE — 77067 SCR MAMMO BI INCL CAD: CPT | Mod: 26

## 2024-07-09 PROCEDURE — 77063 BREAST TOMOSYNTHESIS BI: CPT

## 2024-07-09 PROCEDURE — 77063 BREAST TOMOSYNTHESIS BI: CPT | Mod: 26

## 2024-07-09 PROCEDURE — 77067 SCR MAMMO BI INCL CAD: CPT

## 2024-10-18 ENCOUNTER — APPOINTMENT (OUTPATIENT)
Dept: RADIATION ONCOLOGY | Facility: CLINIC | Age: 64
End: 2024-10-18
Payer: MEDICAID

## 2024-10-18 VITALS
RESPIRATION RATE: 16 BRPM | HEIGHT: 56.5 IN | WEIGHT: 139 LBS | HEART RATE: 84 BPM | OXYGEN SATURATION: 88 % | BODY MASS INDEX: 30.41 KG/M2 | SYSTOLIC BLOOD PRESSURE: 136 MMHG | DIASTOLIC BLOOD PRESSURE: 84 MMHG

## 2024-10-18 DIAGNOSIS — Z87.891 PERSONAL HISTORY OF NICOTINE DEPENDENCE: ICD-10-CM

## 2024-10-18 DIAGNOSIS — Z80.3 FAMILY HISTORY OF MALIGNANT NEOPLASM OF BREAST: ICD-10-CM

## 2024-10-18 DIAGNOSIS — Z86.39 PERSONAL HISTORY OF OTHER ENDOCRINE, NUTRITIONAL AND METABOLIC DISEASE: ICD-10-CM

## 2024-10-18 PROCEDURE — 99204 OFFICE O/P NEW MOD 45 MIN: CPT

## 2024-10-18 PROCEDURE — G2211 COMPLEX E/M VISIT ADD ON: CPT | Mod: NC

## 2024-11-13 ENCOUNTER — APPOINTMENT (OUTPATIENT)
Dept: GYNECOLOGIC ONCOLOGY | Facility: CLINIC | Age: 64
End: 2024-11-13

## 2024-11-13 VITALS
BODY MASS INDEX: 30.19 KG/M2 | HEIGHT: 56.5 IN | DIASTOLIC BLOOD PRESSURE: 72 MMHG | WEIGHT: 138 LBS | SYSTOLIC BLOOD PRESSURE: 128 MMHG

## 2024-11-13 DIAGNOSIS — E03.9 HYPOTHYROIDISM, UNSPECIFIED: ICD-10-CM

## 2024-11-13 PROCEDURE — 99459 PELVIC EXAMINATION: CPT

## 2024-11-13 PROCEDURE — 99204 OFFICE O/P NEW MOD 45 MIN: CPT

## 2024-11-13 PROCEDURE — G2211 COMPLEX E/M VISIT ADD ON: CPT | Mod: NC

## 2024-11-15 ENCOUNTER — OUTPATIENT (OUTPATIENT)
Dept: OUTPATIENT SERVICES | Facility: HOSPITAL | Age: 64
LOS: 1 days | End: 2024-11-15
Payer: MEDICAID

## 2024-11-15 ENCOUNTER — RESULT REVIEW (OUTPATIENT)
Age: 64
End: 2024-11-15

## 2024-11-15 DIAGNOSIS — L30.9 DERMATITIS, UNSPECIFIED: ICD-10-CM

## 2024-11-15 PROCEDURE — 88321 CONSLTJ&REPRT SLD PREP ELSWR: CPT

## 2024-11-16 DIAGNOSIS — L30.9 DERMATITIS, UNSPECIFIED: ICD-10-CM

## 2024-11-20 LAB — SURGICAL PATHOLOGY STUDY: SIGNIFICANT CHANGE UP

## 2025-01-03 ENCOUNTER — EMERGENCY (EMERGENCY)
Facility: HOSPITAL | Age: 65
LOS: 0 days | Discharge: HOME | End: 2025-01-04
Attending: STUDENT IN AN ORGANIZED HEALTH CARE EDUCATION/TRAINING PROGRAM
Payer: MEDICAID

## 2025-01-03 VITALS — WEIGHT: 138.01 LBS

## 2025-01-03 DIAGNOSIS — E03.9 HYPOTHYROIDISM, UNSPECIFIED: ICD-10-CM

## 2025-01-03 DIAGNOSIS — A63.0 ANOGENITAL (VENEREAL) WARTS: ICD-10-CM

## 2025-01-03 DIAGNOSIS — Z88.1 ALLERGY STATUS TO OTHER ANTIBIOTIC AGENTS: ICD-10-CM

## 2025-01-03 DIAGNOSIS — B97.7 PAPILLOMAVIRUS AS THE CAUSE OF DISEASES CLASSIFIED ELSEWHERE: ICD-10-CM

## 2025-01-03 DIAGNOSIS — R22.42 LOCALIZED SWELLING, MASS AND LUMP, LEFT LOWER LIMB: ICD-10-CM

## 2025-01-03 DIAGNOSIS — Z88.8 ALLERGY STATUS TO OTHER DRUGS, MEDICAMENTS AND BIOLOGICAL SUBSTANCES: ICD-10-CM

## 2025-01-03 LAB
ALBUMIN SERPL ELPH-MCNC: 4.5 G/DL — SIGNIFICANT CHANGE UP (ref 3.3–5)
ALP SERPL-CCNC: 33 U/L — LOW (ref 40–120)
ALT FLD-CCNC: 22 U/L — SIGNIFICANT CHANGE UP (ref 12–78)
ANION GAP SERPL CALC-SCNC: 3 MMOL/L — LOW (ref 5–17)
APTT BLD: 33.1 SEC — SIGNIFICANT CHANGE UP (ref 24.5–35.6)
AST SERPL-CCNC: 41 U/L — HIGH (ref 15–37)
BASOPHILS # BLD AUTO: 0.06 K/UL — SIGNIFICANT CHANGE UP (ref 0–0.2)
BASOPHILS NFR BLD AUTO: 0.9 % — SIGNIFICANT CHANGE UP (ref 0–2)
BILIRUB SERPL-MCNC: 0.4 MG/DL — SIGNIFICANT CHANGE UP (ref 0.2–1.2)
BUN SERPL-MCNC: 17 MG/DL — SIGNIFICANT CHANGE UP (ref 7–23)
CALCIUM SERPL-MCNC: 10.7 MG/DL — HIGH (ref 8.5–10.1)
CHLORIDE SERPL-SCNC: 106 MMOL/L — SIGNIFICANT CHANGE UP (ref 96–108)
CO2 SERPL-SCNC: 29 MMOL/L — SIGNIFICANT CHANGE UP (ref 22–31)
CREAT SERPL-MCNC: 1.24 MG/DL — SIGNIFICANT CHANGE UP (ref 0.5–1.3)
D DIMER BLD IA.RAPID-MCNC: 454 NG/ML DDU — HIGH
EGFR: 49 ML/MIN/1.73M2 — LOW
EOSINOPHIL # BLD AUTO: 0.34 K/UL — SIGNIFICANT CHANGE UP (ref 0–0.5)
EOSINOPHIL NFR BLD AUTO: 5 % — SIGNIFICANT CHANGE UP (ref 0–6)
GLUCOSE SERPL-MCNC: 103 MG/DL — HIGH (ref 70–99)
HCT VFR BLD CALC: 34.9 % — SIGNIFICANT CHANGE UP (ref 34.5–45)
HGB BLD-MCNC: 11.4 G/DL — LOW (ref 11.5–15.5)
IMM GRANULOCYTES NFR BLD AUTO: 0.4 % — SIGNIFICANT CHANGE UP (ref 0–0.9)
INR BLD: 0.9 RATIO — SIGNIFICANT CHANGE UP (ref 0.85–1.16)
LYMPHOCYTES # BLD AUTO: 1.52 K/UL — SIGNIFICANT CHANGE UP (ref 1–3.3)
LYMPHOCYTES # BLD AUTO: 22.3 % — SIGNIFICANT CHANGE UP (ref 13–44)
MAGNESIUM SERPL-MCNC: 2.2 MG/DL — SIGNIFICANT CHANGE UP (ref 1.6–2.6)
MCHC RBC-ENTMCNC: 32.7 G/DL — SIGNIFICANT CHANGE UP (ref 32–36)
MCHC RBC-ENTMCNC: 33.5 PG — SIGNIFICANT CHANGE UP (ref 27–34)
MCV RBC AUTO: 102.6 FL — HIGH (ref 80–100)
MONOCYTES # BLD AUTO: 0.77 K/UL — SIGNIFICANT CHANGE UP (ref 0–0.9)
MONOCYTES NFR BLD AUTO: 11.3 % — SIGNIFICANT CHANGE UP (ref 2–14)
NEUTROPHILS # BLD AUTO: 4.09 K/UL — SIGNIFICANT CHANGE UP (ref 1.8–7.4)
NEUTROPHILS NFR BLD AUTO: 60.1 % — SIGNIFICANT CHANGE UP (ref 43–77)
NT-PROBNP SERPL-SCNC: 36 PG/ML — SIGNIFICANT CHANGE UP (ref 0–125)
PLATELET # BLD AUTO: 180 K/UL — SIGNIFICANT CHANGE UP (ref 150–400)
POTASSIUM SERPL-MCNC: 4.2 MMOL/L — SIGNIFICANT CHANGE UP (ref 3.5–5.3)
POTASSIUM SERPL-SCNC: 4.2 MMOL/L — SIGNIFICANT CHANGE UP (ref 3.5–5.3)
PROT SERPL-MCNC: 8.3 GM/DL — SIGNIFICANT CHANGE UP (ref 6–8.3)
PROTHROM AB SERPL-ACNC: 10.6 SEC — SIGNIFICANT CHANGE UP (ref 9.9–13.4)
RBC # BLD: 3.4 M/UL — LOW (ref 3.8–5.2)
RBC # FLD: 15.2 % — HIGH (ref 10.3–14.5)
SODIUM SERPL-SCNC: 138 MMOL/L — SIGNIFICANT CHANGE UP (ref 135–145)
TROPONIN I, HIGH SENSITIVITY RESULT: 6.97 NG/L — SIGNIFICANT CHANGE UP
WBC # BLD: 6.81 K/UL — SIGNIFICANT CHANGE UP (ref 3.8–10.5)
WBC # FLD AUTO: 6.81 K/UL — SIGNIFICANT CHANGE UP (ref 3.8–10.5)

## 2025-01-03 PROCEDURE — 84484 ASSAY OF TROPONIN QUANT: CPT

## 2025-01-03 PROCEDURE — 85610 PROTHROMBIN TIME: CPT

## 2025-01-03 PROCEDURE — 71045 X-RAY EXAM CHEST 1 VIEW: CPT

## 2025-01-03 PROCEDURE — 93971 EXTREMITY STUDY: CPT | Mod: 26,LT

## 2025-01-03 PROCEDURE — 71045 X-RAY EXAM CHEST 1 VIEW: CPT | Mod: 26

## 2025-01-03 PROCEDURE — 85379 FIBRIN DEGRADATION QUANT: CPT

## 2025-01-03 PROCEDURE — 93010 ELECTROCARDIOGRAM REPORT: CPT

## 2025-01-03 PROCEDURE — 99285 EMERGENCY DEPT VISIT HI MDM: CPT | Mod: 25

## 2025-01-03 PROCEDURE — 36415 COLL VENOUS BLD VENIPUNCTURE: CPT

## 2025-01-03 PROCEDURE — 71275 CT ANGIOGRAPHY CHEST: CPT | Mod: MC

## 2025-01-03 PROCEDURE — 93971 EXTREMITY STUDY: CPT | Mod: LT

## 2025-01-03 PROCEDURE — 85730 THROMBOPLASTIN TIME PARTIAL: CPT

## 2025-01-03 PROCEDURE — 99285 EMERGENCY DEPT VISIT HI MDM: CPT

## 2025-01-03 PROCEDURE — 71275 CT ANGIOGRAPHY CHEST: CPT | Mod: 26,MC

## 2025-01-03 PROCEDURE — 85025 COMPLETE CBC W/AUTO DIFF WBC: CPT

## 2025-01-03 PROCEDURE — 83735 ASSAY OF MAGNESIUM: CPT

## 2025-01-03 PROCEDURE — 93005 ELECTROCARDIOGRAM TRACING: CPT

## 2025-01-03 PROCEDURE — 80053 COMPREHEN METABOLIC PANEL: CPT

## 2025-01-03 PROCEDURE — 83880 ASSAY OF NATRIURETIC PEPTIDE: CPT

## 2025-01-03 NOTE — ED ADULT TRIAGE NOTE - CHIEF COMPLAINT QUOTE
pt c/o groin pain and swelling radiating down leg, urinary symptoms. pt states she fell last week, pmh: hypothyroidism, cardiac pleurisy.

## 2025-01-03 NOTE — ED PROVIDER NOTE - PATIENT PORTAL LINK FT
You can access the FollowMyHealth Patient Portal offered by [facility] by registering at the following website: [email or web address]. By joining [facility]’s FollowMyHealth portal, you will also be able to view your health information using other applications (apps) compatible with our system.

## 2025-01-03 NOTE — ED ADULT NURSE NOTE - OBJECTIVE STATEMENT
Pt presents to the ED c/o groin pain & leg swelling. Pt states she has HPV-16 & had a herpes outbreak 2 years ago that still has not healed. Pt states she has consulted multiple OBGYN oncologists who have given her varying opinions. Pt states sores are painful & it is difficult to sit comfortably. Pt also endorses falling yesterday & has a new onset of swelling to her left leg. Denies head strike, LOC or pain. Pt A&Ox4, daughter at bedside. Safety & comfort measures maintained.

## 2025-01-03 NOTE — ED PROVIDER NOTE - PROGRESS NOTE DETAILS
patient's labs are nonactionable, CTA is negative, duplex is negative.  Patient ambulated well.  Patient is prescribed oxygen at home as per pulmonologist and feels well and can do the oxygen at home.  Unclear etiology of patient's hypoxia but patient is followed by pulmonology.  Patient with significant hypothyroidism but she has had this issue for several decades and needs to follow-up with endocrinology.  Had long discussion with patient and patient's daughter who agree with discharge to follow-up with specialist.  Will discharge.

## 2025-01-03 NOTE — ED ADULT NURSE REASSESSMENT NOTE - NS ED NURSE REASSESS COMMENT FT1
Obtained bedside report from URI Fuller at 19:00. Pt is resting comfortably in bed, breathing even and unlabored. VS as charted. Safety and comfort measures maintained.

## 2025-01-03 NOTE — ED ADULT TRIAGE NOTE - GLASGOW COMA SCALE: SCORE, MLM
15 Winlevi Counseling:  I discussed with the patient the risks of topical clascoterone including but not limited to erythema, scaling, itching, and stinging. Patient voiced their understanding.

## 2025-01-03 NOTE — ED ADULT NURSE NOTE - NSFALLHARMRISKINTERV_ED_ALL_ED
Assistance OOB with selected safe patient handling equipment if applicable/Assistance with ambulation/Communicate risk of Fall with Harm to all staff, patient, and family/Monitor gait and stability/Provide visual cue: red socks, yellow wristband, yellow gown, etc/Reinforce activity limits and safety measures with patient and family/Bed in lowest position, wheels locked, appropriate side rails in place/Call bell, personal items and telephone in reach/Instruct patient to call for assistance before getting out of bed/chair/stretcher/Non-slip footwear applied when patient is off stretcher/[place] to call system/Physically safe environment - no spills, clutter or unnecessary equipment/Purposeful Proactive Rounding/Room/bathroom lighting operational, light cord in reach

## 2025-01-03 NOTE — ED PROVIDER NOTE - CLINICAL SUMMARY MEDICAL DECISION MAKING FREE TEXT BOX
64 year old female w PMHx hypothyroidism, cardiac pleurisy, HPV, scarring of right lung presents to the ed c/o groin pain and left leg swelling radiating down her leg, causing her to have trouble with balance and walking. PT claims to be dealing with these issues for years but leg swelling exacerbated yesterday after fall.  patient became hypoxic to 70% on room air, tolerating 2 L nasal cannula well, patient with mass to the labia majora on the left side, no signs of infection in the groin, mild leg pain.  Low suspicion for DVT but will obtain duplex ultrasound, with this new hypoxia will obtain  D-dimer rule out PE or pneumonia.  Plan for labs, EKG, ultrasound,

## 2025-01-03 NOTE — ED PROVIDER NOTE - NSFOLLOWUPINSTRUCTIONS_ED_ALL_ED_FT
Please follow-up with your endocrinologist.    Please follow-up with your gynecologist about the mass.    Please follow-up with your pulmonologist about your breathing.    Please return to the ER develop any new or worsening symptoms.

## 2025-01-03 NOTE — ED ADULT NURSE NOTE - NS ED NOTE  TALK SOMEONE YN
Assessment per flow chart.  Pt A&Ox4, c/o right grt toe pain, see flowchart. [facility] picc site- c,d,i.  Pt's SpO2-96% on R/A.  Discussed POC with pt, pt verbalized understanding and agreement.  Pt instructed to call for assistance, pt's belonging's and call light within reach. Will continue to monitor.     No

## 2025-01-03 NOTE — ED PROVIDER NOTE - PHYSICAL EXAMINATION
Constitutional: NAD, alert, verbal  HEENT: NCAT, EOMi, PERRL  Cardiac: RRR no MRG  Resp: clear, no wheezing or crackles  GI: ab soft ntnd, no r/g  MSK/Ext: no edema  Neuro: YU  Skin: GEN: Patient awake alert NAD.   HEENT: normocephalic, atraumatic, EOMI, no scleral icterus, moist MM  CARDIAC: RRR, S1, S2, no murmur.   PULM: CTA B/L no wheeze, rhonchi, rales.   ABD: soft NT, ND, no rebound no guarding,  MSK: Moving all extremities, no edema.   NEURO: A&Ox3, gait normal, no focal neurological deficits,   SKIN: warm, dry, no rash.  : Mass to the left inferior aspect of the labia majora approximately 2 cm in diameter, no erythema in the groin region or tenderness or fluctuance or induration    Chaperone URI O'Samson

## 2025-01-03 NOTE — ED PROVIDER NOTE - OBJECTIVE STATEMENT
64 year old female w PMHx hypothyroidism, cardiac pleurisy, HPV, scarring of right lung presents to the ed c/o groin pain and left leg swelling radiating down her leg, causing her to have trouble with balance and walking. PT claims to be dealing with these issues for years but leg swelling exacerbated yesterday after fall.  Dealing with HPV for 2.5 years and currently dealing with bad pubic outbreak. Pt claims to be allergic to all thyroid medications. Pt claims to smoke years ago.

## 2025-01-04 VITALS
TEMPERATURE: 98 F | RESPIRATION RATE: 18 BRPM | SYSTOLIC BLOOD PRESSURE: 126 MMHG | DIASTOLIC BLOOD PRESSURE: 83 MMHG | OXYGEN SATURATION: 94 % | HEART RATE: 78 BPM

## 2025-03-31 ENCOUNTER — APPOINTMENT (OUTPATIENT)
Dept: OBGYN | Facility: CLINIC | Age: 65
End: 2025-03-31
Payer: MEDICAID

## 2025-03-31 VITALS
BODY MASS INDEX: 32.16 KG/M2 | SYSTOLIC BLOOD PRESSURE: 122 MMHG | WEIGHT: 147 LBS | DIASTOLIC BLOOD PRESSURE: 80 MMHG | HEIGHT: 56.5 IN

## 2025-03-31 DIAGNOSIS — N90.89 OTHER SPECIFIED NONINFLAMMATORY DISORDERS OF VULVA AND PERINEUM: ICD-10-CM

## 2025-03-31 PROCEDURE — 99203 OFFICE O/P NEW LOW 30 MIN: CPT

## 2025-04-09 ENCOUNTER — APPOINTMENT (OUTPATIENT)
Dept: GYNECOLOGIC ONCOLOGY | Facility: CLINIC | Age: 65
End: 2025-04-09

## 2025-04-09 PROCEDURE — 99459 PELVIC EXAMINATION: CPT

## 2025-04-09 PROCEDURE — G2211 COMPLEX E/M VISIT ADD ON: CPT | Mod: NC

## 2025-04-09 PROCEDURE — 99214 OFFICE O/P EST MOD 30 MIN: CPT

## 2025-05-07 ENCOUNTER — OUTPATIENT (OUTPATIENT)
Dept: OUTPATIENT SERVICES | Facility: HOSPITAL | Age: 65
LOS: 1 days | End: 2025-05-07
Payer: MEDICAID

## 2025-05-07 VITALS
HEIGHT: 56 IN | HEART RATE: 78 BPM | DIASTOLIC BLOOD PRESSURE: 60 MMHG | TEMPERATURE: 98 F | RESPIRATION RATE: 16 BRPM | OXYGEN SATURATION: 95 % | SYSTOLIC BLOOD PRESSURE: 102 MMHG | WEIGHT: 146.83 LBS

## 2025-05-07 DIAGNOSIS — Z90.710 ACQUIRED ABSENCE OF BOTH CERVIX AND UTERUS: Chronic | ICD-10-CM

## 2025-05-07 DIAGNOSIS — Z98.890 OTHER SPECIFIED POSTPROCEDURAL STATES: Chronic | ICD-10-CM

## 2025-05-07 DIAGNOSIS — C51.9 MALIGNANT NEOPLASM OF VULVA, UNSPECIFIED: ICD-10-CM

## 2025-05-07 DIAGNOSIS — Z01.818 ENCOUNTER FOR OTHER PREPROCEDURAL EXAMINATION: ICD-10-CM

## 2025-05-07 LAB
ALBUMIN SERPL ELPH-MCNC: 4.3 G/DL — SIGNIFICANT CHANGE UP (ref 3.3–5)
ALP SERPL-CCNC: 38 U/L — LOW (ref 40–120)
ALT FLD-CCNC: 20 U/L — SIGNIFICANT CHANGE UP (ref 12–78)
ANION GAP SERPL CALC-SCNC: 7 MMOL/L — SIGNIFICANT CHANGE UP (ref 5–17)
AST SERPL-CCNC: 33 U/L — SIGNIFICANT CHANGE UP (ref 15–37)
BASOPHILS # BLD AUTO: 0.11 K/UL — SIGNIFICANT CHANGE UP (ref 0–0.2)
BASOPHILS NFR BLD AUTO: 1.7 % — SIGNIFICANT CHANGE UP (ref 0–2)
BILIRUB SERPL-MCNC: 0.4 MG/DL — SIGNIFICANT CHANGE UP (ref 0.2–1.2)
BUN SERPL-MCNC: 18 MG/DL — SIGNIFICANT CHANGE UP (ref 7–23)
CALCIUM SERPL-MCNC: 11 MG/DL — HIGH (ref 8.5–10.1)
CHLORIDE SERPL-SCNC: 97 MMOL/L — SIGNIFICANT CHANGE UP (ref 96–108)
CO2 SERPL-SCNC: 32 MMOL/L — HIGH (ref 22–31)
CREAT SERPL-MCNC: 1.31 MG/DL — HIGH (ref 0.5–1.3)
EGFR: 46 ML/MIN/1.73M2 — LOW
EGFR: 46 ML/MIN/1.73M2 — LOW
EOSINOPHIL # BLD AUTO: 0.34 K/UL — SIGNIFICANT CHANGE UP (ref 0–0.5)
EOSINOPHIL NFR BLD AUTO: 5.2 % — SIGNIFICANT CHANGE UP (ref 0–6)
GLUCOSE SERPL-MCNC: 103 MG/DL — HIGH (ref 70–99)
HCT VFR BLD CALC: 34.7 % — SIGNIFICANT CHANGE UP (ref 34.5–45)
HGB BLD-MCNC: 11.1 G/DL — LOW (ref 11.5–15.5)
IMM GRANULOCYTES # BLD AUTO: 0.03 K/UL — SIGNIFICANT CHANGE UP (ref 0–0.07)
IMM GRANULOCYTES NFR BLD AUTO: 0.5 % — SIGNIFICANT CHANGE UP (ref 0–0.9)
LYMPHOCYTES # BLD AUTO: 1.43 K/UL — SIGNIFICANT CHANGE UP (ref 1–3.3)
LYMPHOCYTES NFR BLD AUTO: 22 % — SIGNIFICANT CHANGE UP (ref 13–44)
MCHC RBC-ENTMCNC: 32 G/DL — SIGNIFICANT CHANGE UP (ref 32–36)
MCHC RBC-ENTMCNC: 33.2 PG — SIGNIFICANT CHANGE UP (ref 27–34)
MCV RBC AUTO: 103.9 FL — HIGH (ref 80–100)
MONOCYTES # BLD AUTO: 0.77 K/UL — SIGNIFICANT CHANGE UP (ref 0–0.9)
MONOCYTES NFR BLD AUTO: 11.9 % — SIGNIFICANT CHANGE UP (ref 2–14)
NEUTROPHILS # BLD AUTO: 3.81 K/UL — SIGNIFICANT CHANGE UP (ref 1.8–7.4)
NEUTROPHILS NFR BLD AUTO: 58.7 % — SIGNIFICANT CHANGE UP (ref 43–77)
NRBC # BLD AUTO: 0 K/UL — SIGNIFICANT CHANGE UP (ref 0–0)
NRBC # FLD: 0 K/UL — SIGNIFICANT CHANGE UP (ref 0–0)
NRBC BLD AUTO-RTO: 0 /100 WBCS — SIGNIFICANT CHANGE UP (ref 0–0)
PLATELET # BLD AUTO: 208 K/UL — SIGNIFICANT CHANGE UP (ref 150–400)
PMV BLD: 11.7 FL — SIGNIFICANT CHANGE UP (ref 7–13)
POTASSIUM SERPL-MCNC: 4.1 MMOL/L — SIGNIFICANT CHANGE UP (ref 3.5–5.3)
POTASSIUM SERPL-SCNC: 4.1 MMOL/L — SIGNIFICANT CHANGE UP (ref 3.5–5.3)
PROT SERPL-MCNC: 8.2 GM/DL — SIGNIFICANT CHANGE UP (ref 6–8.3)
RBC # BLD: 3.34 M/UL — LOW (ref 3.8–5.2)
RBC # FLD: 15.5 % — HIGH (ref 10.3–14.5)
SODIUM SERPL-SCNC: 136 MMOL/L — SIGNIFICANT CHANGE UP (ref 135–145)
WBC # BLD: 6.49 K/UL — SIGNIFICANT CHANGE UP (ref 3.8–10.5)
WBC # FLD AUTO: 6.49 K/UL — SIGNIFICANT CHANGE UP (ref 3.8–10.5)

## 2025-05-07 PROCEDURE — 36415 COLL VENOUS BLD VENIPUNCTURE: CPT

## 2025-05-07 PROCEDURE — 93005 ELECTROCARDIOGRAM TRACING: CPT

## 2025-05-07 PROCEDURE — 86900 BLOOD TYPING SEROLOGIC ABO: CPT

## 2025-05-07 PROCEDURE — 85025 COMPLETE CBC W/AUTO DIFF WBC: CPT

## 2025-05-07 PROCEDURE — 86850 RBC ANTIBODY SCREEN: CPT

## 2025-05-07 PROCEDURE — 86901 BLOOD TYPING SEROLOGIC RH(D): CPT

## 2025-05-07 PROCEDURE — 80053 COMPREHEN METABOLIC PANEL: CPT

## 2025-05-07 PROCEDURE — 99214 OFFICE O/P EST MOD 30 MIN: CPT | Mod: 25

## 2025-05-07 PROCEDURE — 93010 ELECTROCARDIOGRAM REPORT: CPT

## 2025-05-07 NOTE — H&P PST ADULT - NSSUBSTANCEUSE_GEN_ALL_CORE_SD
Doing well in ICU. Awake, HA right posteriro 6/10 expected  Neuro non focal  No subjective vision changes  Groins soft, dry    Important to keep HOB up and mobilize this evening to promote cranial venous drainage    Dr. Cande Muse (her neurologist) is aware of admission. denies drug use/caffeine

## 2025-05-07 NOTE — H&P PST ADULT - PROBLEM SELECTOR PLAN 1
Pre op instructions given and explained.  Avoid NSAIDs and OTC supplements.   Patient verbalized understanding  medical and cardiac optimization requested by surgeon   cbc, bmp, type and screen,  done today in PST

## 2025-05-07 NOTE — H&P PST ADULT - HISTORY OF PRESENT ILLNESS
65 y/o female accompanied by daughter Irene morrow historian presents to [facility] for scheduled partial vulvectomy bilateral inguinal sentinel lymph node dissection on 5/14/25. Patient reports vaginal lesion for several years, seen by gyn dx with HPV years ago. Recent follow up due to pain to area dx with vulva cancer.

## 2025-05-07 NOTE — H&P PST ADULT - NSICDXFAMILYHX_GEN_ALL_CORE_FT
FAMILY HISTORY:  Mother  Still living? Unknown  FH: HTN (hypertension), Age at diagnosis: Age Unknown    Sibling  Still living? Unknown  FHx: breast cancer, Age at diagnosis: Age Unknown

## 2025-05-07 NOTE — H&P PST ADULT - NSICDXPASTSURGICALHX_GEN_ALL_CORE_FT
PAST SURGICAL HISTORY:  H/O: hysterectomy     History of eyelid surgery     S/P hemorrhoidectomy

## 2025-05-07 NOTE — H&P PST ADULT - NSICDXPASTMEDICALHX_GEN_ALL_CORE_FT
PAST MEDICAL HISTORY:  Hashimoto's disease     [place] (hard of hearing)     HPV in female     Hypoxia     Morbid obesity     On home oxygen therapy     Scarring of lung     Swelling of right lower eyelid     Vulva cancer

## 2025-05-07 NOTE — H&P PST ADULT - ASSESSMENT
65 y/o female accompanied by daughter Irene morrow historian presents to [facility] for scheduled partial vulvectomy bilateral inguinal sentinel lymph node dissection on 5/14/25.

## 2025-05-08 DIAGNOSIS — Z01.818 ENCOUNTER FOR OTHER PREPROCEDURAL EXAMINATION: ICD-10-CM

## 2025-05-14 ENCOUNTER — RESULT REVIEW (OUTPATIENT)
Age: 65
End: 2025-05-14

## 2025-05-14 ENCOUNTER — TRANSCRIPTION ENCOUNTER (OUTPATIENT)
Age: 65
End: 2025-05-14

## 2025-05-14 ENCOUNTER — OUTPATIENT (OUTPATIENT)
Dept: INPATIENT UNIT | Facility: HOSPITAL | Age: 65
LOS: 1 days | Discharge: HOME | End: 2025-05-14
Payer: MEDICAID

## 2025-05-14 VITALS
HEART RATE: 93 BPM | OXYGEN SATURATION: 90 % | DIASTOLIC BLOOD PRESSURE: 94 MMHG | RESPIRATION RATE: 18 BRPM | SYSTOLIC BLOOD PRESSURE: 147 MMHG | TEMPERATURE: 98 F

## 2025-05-14 VITALS
WEIGHT: 146.83 LBS | SYSTOLIC BLOOD PRESSURE: 135 MMHG | HEIGHT: 56 IN | HEART RATE: 78 BPM | RESPIRATION RATE: 16 BRPM | TEMPERATURE: 98 F | OXYGEN SATURATION: 97 % | DIASTOLIC BLOOD PRESSURE: 81 MMHG

## 2025-05-14 DIAGNOSIS — Z88.0 ALLERGY STATUS TO PENICILLIN: ICD-10-CM

## 2025-05-14 DIAGNOSIS — Z88.1 ALLERGY STATUS TO OTHER ANTIBIOTIC AGENTS: ICD-10-CM

## 2025-05-14 DIAGNOSIS — Z90.710 ACQUIRED ABSENCE OF BOTH CERVIX AND UTERUS: Chronic | ICD-10-CM

## 2025-05-14 DIAGNOSIS — C51.9 MALIGNANT NEOPLASM OF VULVA, UNSPECIFIED: ICD-10-CM

## 2025-05-14 DIAGNOSIS — I10 ESSENTIAL (PRIMARY) HYPERTENSION: ICD-10-CM

## 2025-05-14 DIAGNOSIS — E06.3 AUTOIMMUNE THYROIDITIS: ICD-10-CM

## 2025-05-14 DIAGNOSIS — Z98.890 OTHER SPECIFIED POSTPROCEDURAL STATES: Chronic | ICD-10-CM

## 2025-05-14 DIAGNOSIS — E66.01 MORBID (SEVERE) OBESITY DUE TO EXCESS CALORIES: ICD-10-CM

## 2025-05-14 DIAGNOSIS — Z87.891 PERSONAL HISTORY OF NICOTINE DEPENDENCE: ICD-10-CM

## 2025-05-14 PROCEDURE — 88309 TISSUE EXAM BY PATHOLOGIST: CPT | Mod: 26

## 2025-05-14 PROCEDURE — 88341 IMHCHEM/IMCYTCHM EA ADD ANTB: CPT | Mod: 26

## 2025-05-14 PROCEDURE — 88342 IMHCHEM/IMCYTCHM 1ST ANTB: CPT | Mod: 26

## 2025-05-14 PROCEDURE — 38900 IO MAP OF SENT LYMPH NODE: CPT | Mod: 50

## 2025-05-14 PROCEDURE — 88309 TISSUE EXAM BY PATHOLOGIST: CPT

## 2025-05-14 PROCEDURE — 56632 VLVCTMY RAD PRTL BI LYMPHAD: CPT | Mod: 80

## 2025-05-14 PROCEDURE — 38900 IO MAP OF SENT LYMPH NODE: CPT | Mod: 80,50

## 2025-05-14 PROCEDURE — 88341 IMHCHEM/IMCYTCHM EA ADD ANTB: CPT

## 2025-05-14 PROCEDURE — 88307 TISSUE EXAM BY PATHOLOGIST: CPT | Mod: 26

## 2025-05-14 PROCEDURE — C9399: CPT

## 2025-05-14 PROCEDURE — 56632 VLVCTMY RAD PRTL BI LYMPHAD: CPT

## 2025-05-14 PROCEDURE — 88342 IMHCHEM/IMCYTCHM 1ST ANTB: CPT

## 2025-05-14 PROCEDURE — 88307 TISSUE EXAM BY PATHOLOGIST: CPT

## 2025-05-14 RX ORDER — OXYCODONE HYDROCHLORIDE 30 MG/1
5 TABLET ORAL ONCE
Refills: 0 | Status: DISCONTINUED | OUTPATIENT
Start: 2025-05-14 | End: 2025-05-14

## 2025-05-14 RX ORDER — FENTANYL CITRATE-0.9 % NACL/PF 100MCG/2ML
50 SYRINGE (ML) INTRAVENOUS
Refills: 0 | Status: DISCONTINUED | OUTPATIENT
Start: 2025-05-14 | End: 2025-05-14

## 2025-05-14 RX ORDER — ACETAMINOPHEN 500 MG/5ML
2 LIQUID (ML) ORAL
Refills: 0 | DISCHARGE

## 2025-05-14 RX ORDER — TERBINAFINE HYDROCHLORIDE 10 MG/G
1 CREAM TOPICAL
Qty: 1 | Refills: 0
Start: 2025-05-14 | End: 2025-05-23

## 2025-05-14 RX ORDER — SODIUM CHLORIDE 9 G/1000ML
1000 INJECTION, SOLUTION INTRAVENOUS
Refills: 0 | Status: DISCONTINUED | OUTPATIENT
Start: 2025-05-14 | End: 2025-05-14

## 2025-05-14 RX ORDER — ONDANSETRON HCL/PF 4 MG/2 ML
4 VIAL (ML) INJECTION EVERY 6 HOURS
Refills: 0 | Status: DISCONTINUED | OUTPATIENT
Start: 2025-05-14 | End: 2025-05-14

## 2025-05-14 NOTE — ASU PATIENT PROFILE, ADULT - FALL HARM RISK - RISK INTERVENTIONS
Assistance OOB with selected safe patient handling equipment/Assistance with ambulation/Communicate Fall Risk and Risk Factors to all staff, patient, and family/Discuss with provider need for PT consult/Monitor gait and stability/Reinforce activity limits and safety measures with patient and family/Visual Cue: Yellow wristband/Bed in lowest position, wheels locked, appropriate side rails in place/Call bell, personal items and telephone in reach/Instruct patient to call for assistance before getting out of bed or chair/Non-slip footwear when patient is out of bed/[place] to call system/Physically safe environment - no spills, clutter or unnecessary equipment/Purposeful Proactive Rounding/Room/bathroom lighting operational, light cord in reach

## 2025-05-14 NOTE — BRIEF OPERATIVE NOTE - NSICDXBRIEFPROCEDURE_GEN_ALL_CORE_FT
PROCEDURES:  Vulvectomy, left 14-May-2025 09:58:48  Mell العراقي  Biopsy, lymph node, sentinel, inguinal 14-May-2025 09:59:09  Mell العراقي

## 2025-05-14 NOTE — BRIEF OPERATIVE NOTE - OPERATION/FINDINGS
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

## 2025-05-14 NOTE — ASU DISCHARGE PLAN (ADULT/PEDIATRIC) - CARE PROVIDER_API CALL
Willian Padgett  Gynecologic Oncology  [address] [place] [id number]  Phone: [phone]  Fax: [phone]  Follow Up Time:

## 2025-05-14 NOTE — ASU DISCHARGE PLAN (ADULT/PEDIATRIC) - FINANCIAL ASSISTANCE
[facility] provides services at a reduced cost to those who are determined to be eligible through [facility]’s financial assistance program. Information regarding [facility]’s financial assistance program can be found by going to [email or web address] or by calling [phone].

## 2025-05-14 NOTE — ASU PATIENT PROFILE, ADULT - NSICDXPASTMEDICALHX_GEN_ALL_CORE_FT
PAST MEDICAL HISTORY:  Hashimoto's disease     [place] (hard of hearing)     HPV in female     Hypoxia     Morbid obesity     On home oxygen therapy     Scarring of lung     Swelling of right lower eyelid     Vulva cancer

## 2025-05-15 PROBLEM — E66.01 MORBID (SEVERE) OBESITY DUE TO EXCESS CALORIES: Chronic | Status: ACTIVE | Noted: 2025-05-07

## 2025-05-15 PROBLEM — R09.02 HYPOXEMIA: Chronic | Status: ACTIVE | Noted: 2025-05-07

## 2025-05-15 PROBLEM — Z99.81 DEPENDENCE ON SUPPLEMENTAL OXYGEN: Chronic | Status: ACTIVE | Noted: 2025-05-07

## 2025-05-15 PROBLEM — J98.4 OTHER DISORDERS OF LUNG: Chronic | Status: ACTIVE | Noted: 2025-05-07

## 2025-05-23 LAB — SURGICAL PATHOLOGY STUDY: SIGNIFICANT CHANGE UP

## 2025-05-28 ENCOUNTER — APPOINTMENT (OUTPATIENT)
Dept: GYNECOLOGIC ONCOLOGY | Facility: CLINIC | Age: 65
End: 2025-05-28
Payer: MEDICAID

## 2025-05-28 VITALS
OXYGEN SATURATION: 95 % | TEMPERATURE: 97.6 F | SYSTOLIC BLOOD PRESSURE: 142 MMHG | BODY MASS INDEX: 32.37 KG/M2 | HEART RATE: 88 BPM | RESPIRATION RATE: 16 BRPM | WEIGHT: 148 LBS | DIASTOLIC BLOOD PRESSURE: 86 MMHG | HEIGHT: 56.5 IN

## 2025-05-28 DIAGNOSIS — C51.9 MALIGNANT NEOPLASM OF VULVA, UNSPECIFIED: ICD-10-CM

## 2025-05-28 PROBLEM — H02.842 EDEMA OF RIGHT LOWER EYELID: Chronic | Status: ACTIVE | Noted: 2025-05-07

## 2025-05-28 PROBLEM — B97.7 PAPILLOMAVIRUS AS THE CAUSE OF DISEASES CLASSIFIED ELSEWHERE: Chronic | Status: ACTIVE | Noted: 2025-05-07

## 2025-05-28 PROBLEM — H91.90 UNSPECIFIED HEARING LOSS, UNSPECIFIED EAR: Chronic | Status: ACTIVE | Noted: 2025-05-07

## 2025-05-28 PROCEDURE — 99024 POSTOP FOLLOW-UP VISIT: CPT

## 2025-06-03 ENCOUNTER — NON-APPOINTMENT (OUTPATIENT)
Age: 65
End: 2025-06-03

## 2025-06-18 ENCOUNTER — APPOINTMENT (OUTPATIENT)
Dept: GYNECOLOGIC ONCOLOGY | Facility: CLINIC | Age: 65
End: 2025-06-18
Payer: MEDICAID

## 2025-06-18 VITALS — HEIGHT: 56.5 IN | WEIGHT: 148 LBS | BODY MASS INDEX: 32.37 KG/M2

## 2025-06-18 PROCEDURE — 99024 POSTOP FOLLOW-UP VISIT: CPT

## 2025-09-10 ENCOUNTER — APPOINTMENT (OUTPATIENT)
Dept: GYNECOLOGIC ONCOLOGY | Facility: CLINIC | Age: 65
End: 2025-09-10
Payer: MEDICAID

## 2025-09-10 VITALS — HEIGHT: 56.5 IN | WEIGHT: 147 LBS | BODY MASS INDEX: 32.16 KG/M2

## 2025-09-10 DIAGNOSIS — B00.9 HERPESVIRAL INFECTION, UNSPECIFIED: ICD-10-CM

## 2025-09-10 DIAGNOSIS — C51.9 MALIGNANT NEOPLASM OF VULVA, UNSPECIFIED: ICD-10-CM

## 2025-09-10 PROCEDURE — 99214 OFFICE O/P EST MOD 30 MIN: CPT

## 2025-09-10 PROCEDURE — G2211 COMPLEX E/M VISIT ADD ON: CPT | Mod: NC

## 2025-09-10 PROCEDURE — 99459 PELVIC EXAMINATION: CPT

## 2025-09-10 RX ORDER — VALACYCLOVIR 1 G/1
1 TABLET, FILM COATED ORAL 3 TIMES DAILY
Qty: 21 | Refills: 2 | Status: ACTIVE | COMMUNITY
Start: 2025-09-10 | End: 1900-01-01

## 2025-09-24 PROBLEM — C51.9 VULVAR CANCER: Status: ACTIVE | Noted: 2025-09-24
